# Patient Record
Sex: FEMALE | Race: WHITE | Employment: PART TIME | URBAN - METROPOLITAN AREA
[De-identification: names, ages, dates, MRNs, and addresses within clinical notes are randomized per-mention and may not be internally consistent; named-entity substitution may affect disease eponyms.]

---

## 2018-04-04 ENCOUNTER — OFFICE VISIT (OUTPATIENT)
Dept: PODIATRY | Age: 58
End: 2018-04-04
Payer: COMMERCIAL

## 2018-04-04 VITALS
HEART RATE: 76 BPM | WEIGHT: 185 LBS | DIASTOLIC BLOOD PRESSURE: 62 MMHG | BODY MASS INDEX: 30.82 KG/M2 | HEIGHT: 65 IN | SYSTOLIC BLOOD PRESSURE: 116 MMHG

## 2018-04-04 DIAGNOSIS — L98.9 BENIGN SKIN LESION: Primary | ICD-10-CM

## 2018-04-04 DIAGNOSIS — M79.672 PAIN IN BOTH FEET: ICD-10-CM

## 2018-04-04 DIAGNOSIS — M79.671 PAIN IN BOTH FEET: ICD-10-CM

## 2018-04-04 DIAGNOSIS — L85.1 ACQUIRED PLANTAR KERATODERMA: ICD-10-CM

## 2018-04-04 PROCEDURE — 99203 OFFICE O/P NEW LOW 30 MIN: CPT | Performed by: PODIATRIST

## 2018-04-04 RX ORDER — TRAMADOL HYDROCHLORIDE 50 MG/1
TABLET ORAL
COMMUNITY
Start: 2018-03-14 | End: 2019-10-22 | Stop reason: SDUPTHER

## 2018-04-04 RX ORDER — LEVOTHYROXINE SODIUM 0.1 MG/1
TABLET ORAL
COMMUNITY
Start: 2018-01-10 | End: 2019-02-12 | Stop reason: DRUGHIGH

## 2018-04-04 RX ORDER — CELECOXIB 200 MG/1
CAPSULE ORAL
COMMUNITY
Start: 2018-03-05 | End: 2018-08-06

## 2018-04-04 ASSESSMENT — ENCOUNTER SYMPTOMS
VOMITING: 0
CONSTIPATION: 0
NAUSEA: 0
DIARRHEA: 0
COLOR CHANGE: 0

## 2018-04-30 ENCOUNTER — OFFICE VISIT (OUTPATIENT)
Dept: PODIATRY | Age: 58
End: 2018-04-30
Payer: COMMERCIAL

## 2018-04-30 VITALS
BODY MASS INDEX: 29.73 KG/M2 | SYSTOLIC BLOOD PRESSURE: 120 MMHG | DIASTOLIC BLOOD PRESSURE: 76 MMHG | WEIGHT: 185 LBS | HEIGHT: 66 IN | HEART RATE: 93 BPM

## 2018-04-30 DIAGNOSIS — M79.671 PAIN IN BOTH FEET: ICD-10-CM

## 2018-04-30 DIAGNOSIS — L98.9 BENIGN SKIN LESION: Primary | ICD-10-CM

## 2018-04-30 DIAGNOSIS — M84.374A STRESS FRACTURE OF NAVICULAR BONE OF RIGHT FOOT: ICD-10-CM

## 2018-04-30 DIAGNOSIS — L85.1 ACQUIRED PLANTAR KERATODERMA: ICD-10-CM

## 2018-04-30 DIAGNOSIS — M79.672 PAIN IN BOTH FEET: ICD-10-CM

## 2018-04-30 DIAGNOSIS — M19.071 PRIMARY OSTEOARTHRITIS OF RIGHT FOOT: ICD-10-CM

## 2018-04-30 DIAGNOSIS — M79.671 PAIN IN RIGHT FOOT: ICD-10-CM

## 2018-04-30 PROCEDURE — 99213 OFFICE O/P EST LOW 20 MIN: CPT | Performed by: PODIATRIST

## 2018-04-30 PROCEDURE — 73630 X-RAY EXAM OF FOOT: CPT | Performed by: PODIATRIST

## 2018-04-30 RX ORDER — PREDNISONE 10 MG/1
1 TABLET ORAL DAILY
Qty: 21 EACH | Refills: 0 | Status: SHIPPED | OUTPATIENT
Start: 2018-04-30 | End: 2018-05-22 | Stop reason: ALTCHOICE

## 2018-04-30 ASSESSMENT — ENCOUNTER SYMPTOMS
NAUSEA: 0
VOMITING: 0
DIARRHEA: 0
COLOR CHANGE: 0
CONSTIPATION: 0

## 2018-05-22 ENCOUNTER — OFFICE VISIT (OUTPATIENT)
Dept: PODIATRY | Age: 58
End: 2018-05-22
Payer: COMMERCIAL

## 2018-05-22 VITALS
DIASTOLIC BLOOD PRESSURE: 58 MMHG | WEIGHT: 185 LBS | HEIGHT: 66 IN | HEART RATE: 84 BPM | BODY MASS INDEX: 29.73 KG/M2 | SYSTOLIC BLOOD PRESSURE: 115 MMHG

## 2018-05-22 DIAGNOSIS — M79.671 PAIN IN RIGHT FOOT: ICD-10-CM

## 2018-05-22 DIAGNOSIS — M79.672 PAIN IN BOTH FEET: ICD-10-CM

## 2018-05-22 DIAGNOSIS — M84.374A STRESS FRACTURE OF NAVICULAR BONE OF RIGHT FOOT: Primary | ICD-10-CM

## 2018-05-22 DIAGNOSIS — M79.671 PAIN IN BOTH FEET: ICD-10-CM

## 2018-05-22 DIAGNOSIS — L98.9 BENIGN SKIN LESION: ICD-10-CM

## 2018-05-22 DIAGNOSIS — M19.071 PRIMARY OSTEOARTHRITIS OF RIGHT FOOT: ICD-10-CM

## 2018-05-22 DIAGNOSIS — L85.1 ACQUIRED PLANTAR KERATODERMA: ICD-10-CM

## 2018-05-22 PROCEDURE — 99213 OFFICE O/P EST LOW 20 MIN: CPT | Performed by: PODIATRIST

## 2018-05-22 ASSESSMENT — ENCOUNTER SYMPTOMS
COLOR CHANGE: 0
DIARRHEA: 0
CONSTIPATION: 0
VOMITING: 0
NAUSEA: 0

## 2018-08-06 ENCOUNTER — OFFICE VISIT (OUTPATIENT)
Dept: PODIATRY | Age: 58
End: 2018-08-06
Payer: COMMERCIAL

## 2018-08-06 VITALS
BODY MASS INDEX: 29.73 KG/M2 | WEIGHT: 185 LBS | SYSTOLIC BLOOD PRESSURE: 133 MMHG | HEIGHT: 66 IN | HEART RATE: 70 BPM | DIASTOLIC BLOOD PRESSURE: 69 MMHG

## 2018-08-06 DIAGNOSIS — L98.9 BENIGN SKIN LESION: Primary | ICD-10-CM

## 2018-08-06 DIAGNOSIS — M79.672 PAIN IN BOTH FEET: ICD-10-CM

## 2018-08-06 DIAGNOSIS — M79.671 PAIN IN BOTH FEET: ICD-10-CM

## 2018-08-06 DIAGNOSIS — L85.1 ACQUIRED PLANTAR KERATODERMA: ICD-10-CM

## 2018-08-06 PROCEDURE — 99213 OFFICE O/P EST LOW 20 MIN: CPT | Performed by: PODIATRIST

## 2018-08-06 ASSESSMENT — ENCOUNTER SYMPTOMS
CONSTIPATION: 0
VOMITING: 0
NAUSEA: 0
DIARRHEA: 0
COLOR CHANGE: 0

## 2018-08-06 NOTE — PROGRESS NOTES
[facility]  Return Patient History and Physical    Chief Complaint:   Chief Complaint   Patient presents with    Callouses     callous trim b/l       HPI: Son Huston is a 62 y.o. female who presents to the office today for follow up of painful callouses, pain right foot and left, growing rapidly. They started hurting again 1 month ago. I saw her many years ago for the same problem. She has been seeing Dr. Clara Delgado. Has had orthotics. Works on her feet at [employer]. Currently denies F/C/N/V. No Known Allergies    Past Medical History:   Diagnosis Date    DDD (degenerative disc disease), thoracolumbar     Osteoarthritis        Prior to Admission medications    Medication Sig Start Date End Date Taking? Authorizing Provider   traMADol Bosie Burner) 50 MG tablet  3/14/18  Yes Historical Provider, MD   sertraline (ZOLOFT) 50 MG tablet  3/18/18  Yes Historical Provider, MD   levothyroxine (SYNTHROID) 100 MCG tablet  1/10/18  Yes Historical Provider, MD       Past Surgical History:   Procedure Laterality Date    APPENDECTOMY      CARPAL TUNNEL RELEASE      FOOT SURGERY      GALLBLADDER SURGERY         Family History   Problem Relation Age of Onset    Arthritis Mother     Cancer Mother     Arthritis Father     Arthritis Brother     Diabetes Maternal Uncle     Arthritis Maternal Grandmother     Cancer Maternal Grandmother     Diabetes Maternal Grandmother     Cancer Maternal Grandfather        Social History   Substance Use Topics    Smoking status: Current Every Day Smoker     Packs/day: 0.50     Years: 40.00     Types: Cigarettes    Smokeless tobacco: Never Used    Alcohol use Not on file       Review of Systems   Constitutional: Negative for chills, diaphoresis, fatigue, fever and unexpected weight change. Cardiovascular: Negative for leg swelling. Gastrointestinal: Negative for constipation, diarrhea, nausea and vomiting. Musculoskeletal: Positive for gait problem.  Negative RTC in 4-6 weeks    8/6/2018

## 2018-10-09 ENCOUNTER — OFFICE VISIT (OUTPATIENT)
Dept: PODIATRY | Age: 58
End: 2018-10-09
Payer: COMMERCIAL

## 2018-10-09 VITALS
SYSTOLIC BLOOD PRESSURE: 124 MMHG | HEART RATE: 63 BPM | WEIGHT: 185 LBS | DIASTOLIC BLOOD PRESSURE: 68 MMHG | HEIGHT: 67 IN | BODY MASS INDEX: 29.03 KG/M2

## 2018-10-09 DIAGNOSIS — M79.671 PAIN IN BOTH FEET: ICD-10-CM

## 2018-10-09 DIAGNOSIS — L85.1 ACQUIRED PLANTAR KERATODERMA: ICD-10-CM

## 2018-10-09 DIAGNOSIS — M79.672 PAIN IN BOTH FEET: ICD-10-CM

## 2018-10-09 DIAGNOSIS — L98.9 BENIGN SKIN LESION: Primary | ICD-10-CM

## 2018-10-09 PROCEDURE — 99212 OFFICE O/P EST SF 10 MIN: CPT | Performed by: PODIATRIST

## 2018-10-09 RX ORDER — CELECOXIB 200 MG/1
200 CAPSULE ORAL
COMMUNITY
Start: 2018-06-27 | End: 2022-06-30 | Stop reason: DRUGHIGH

## 2018-10-09 ASSESSMENT — ENCOUNTER SYMPTOMS
NAUSEA: 0
CONSTIPATION: 0
DIARRHEA: 0
VOMITING: 0
COLOR CHANGE: 0

## 2018-10-09 NOTE — PROGRESS NOTES
diarrhea, nausea and vomiting. Musculoskeletal: Positive for gait problem. Negative for arthralgias and joint swelling. Skin: Negative for color change, pallor, rash and wound. Neurological: Negative for weakness and numbness. Lower Extremity Physical Examination:     Vitals:   Vitals:    10/09/18 1448   BP: 124/68   Pulse: 63     General: AAO x 3 in NAD. Vascular: DP and PT pulses palpable 2/4, Bilateral.  CFT <3 seconds, Bilateral.  Hair growth present to the level of the digits, Bilateral.  Edema absent, Bilateral.  Varicosities absent, Bilateral. Erythema absent, Bilateral    Neurological:  Sensation present to light touch to level of digits, Bilateral.    Musculoskeletal: Muscle strength 5/5, Bilateral.  Decreased fat pad sub 5th met bilateral, adductovarus left 5th toe. No Pain dorsal and medial navicular right, and resolved at the T-N joint, insertion of the PT tendon also. Integument: Warm, dry, supple, Bilateral.  Hyperkeratosis with hard central core, 3 under right 5th met head, one right sub 1st met head, left 5th toe. Radiographs: 3 views right Foot:  Severe degenerative changes at the talo-navicular joint. Fracture of the dorsal portion of the navicular, non-displaced and age indeterminate Generalized osteopenia. Asessment: Patient is a 62 y.o. female with:   1. Benign skin lesion    2. Pain in both feet    3. Acquired plantar keratoderma          Plan: Patient examined and evaluated. Current condition and treatment options discussed in detail. Verbal and written instructions given to patient. Contact office with any questions/problems/concerns. The lesion(s) was partially debrided, enucleated, and silver nitrate was applied with an apperature pad under occlusion. The patient will leave in place for 24-48 hours and than remove. The patient tolerated the procedure well and without complication.        Wear good shoes    No orders of the defined types were placed in

## 2018-12-11 ENCOUNTER — OFFICE VISIT (OUTPATIENT)
Dept: PODIATRY | Age: 58
End: 2018-12-11
Payer: COMMERCIAL

## 2018-12-11 VITALS — BODY MASS INDEX: 29.03 KG/M2 | HEIGHT: 67 IN | WEIGHT: 185 LBS

## 2018-12-11 DIAGNOSIS — M79.671 PAIN IN BOTH FEET: ICD-10-CM

## 2018-12-11 DIAGNOSIS — L85.1 ACQUIRED PLANTAR KERATODERMA: ICD-10-CM

## 2018-12-11 DIAGNOSIS — L98.9 BENIGN SKIN LESION: Primary | ICD-10-CM

## 2018-12-11 DIAGNOSIS — M79.672 PAIN IN BOTH FEET: ICD-10-CM

## 2018-12-11 PROCEDURE — 99213 OFFICE O/P EST LOW 20 MIN: CPT | Performed by: PODIATRIST

## 2018-12-12 ASSESSMENT — ENCOUNTER SYMPTOMS
VOMITING: 0
COLOR CHANGE: 0
DIARRHEA: 0
NAUSEA: 0
CONSTIPATION: 0

## 2018-12-12 NOTE — PROGRESS NOTES
the defined types were placed in this encounter. No orders of the defined types were placed in this encounter.        RTC in 4-6 weeks    12/11/2018

## 2019-02-12 ENCOUNTER — OFFICE VISIT (OUTPATIENT)
Dept: PODIATRY | Age: 59
End: 2019-02-12
Payer: COMMERCIAL

## 2019-02-12 VITALS — BODY MASS INDEX: 27.94 KG/M2 | WEIGHT: 178 LBS | HEIGHT: 67 IN

## 2019-02-12 DIAGNOSIS — L85.1 ACQUIRED PLANTAR KERATODERMA: ICD-10-CM

## 2019-02-12 DIAGNOSIS — M79.671 PAIN IN BOTH FEET: ICD-10-CM

## 2019-02-12 DIAGNOSIS — M79.672 PAIN IN BOTH FEET: ICD-10-CM

## 2019-02-12 DIAGNOSIS — L98.9 BENIGN SKIN LESION: Primary | ICD-10-CM

## 2019-02-12 PROCEDURE — 99213 OFFICE O/P EST LOW 20 MIN: CPT | Performed by: PODIATRIST

## 2019-02-12 RX ORDER — LEVOTHYROXINE SODIUM 0.07 MG/1
TABLET ORAL
Refills: 3 | COMMUNITY
Start: 2019-01-14 | End: 2021-02-18

## 2019-02-12 ASSESSMENT — ENCOUNTER SYMPTOMS
VOMITING: 0
COLOR CHANGE: 0
CONSTIPATION: 0
NAUSEA: 0
DIARRHEA: 0

## 2019-04-16 ENCOUNTER — OFFICE VISIT (OUTPATIENT)
Dept: PODIATRY | Age: 59
End: 2019-04-16
Payer: COMMERCIAL

## 2019-04-16 VITALS — WEIGHT: 180 LBS | HEIGHT: 63 IN | BODY MASS INDEX: 31.89 KG/M2

## 2019-04-16 DIAGNOSIS — L98.9 BENIGN SKIN LESION: Primary | ICD-10-CM

## 2019-04-16 DIAGNOSIS — L85.1 ACQUIRED PLANTAR KERATODERMA: ICD-10-CM

## 2019-04-16 DIAGNOSIS — M79.672 PAIN IN BOTH FEET: ICD-10-CM

## 2019-04-16 DIAGNOSIS — M79.671 PAIN IN BOTH FEET: ICD-10-CM

## 2019-04-16 PROCEDURE — 99213 OFFICE O/P EST LOW 20 MIN: CPT | Performed by: PODIATRIST

## 2019-04-16 ASSESSMENT — ENCOUNTER SYMPTOMS
DIARRHEA: 0
VOMITING: 0
NAUSEA: 0
COLOR CHANGE: 0
CONSTIPATION: 0

## 2019-04-16 NOTE — PROGRESS NOTES
[facility]  Return Patient History and Physical    Chief Complaint:   Chief Complaint   Patient presents with    Callouses     B/L CALLOUS TRIM       HPI: Dave Agee is a 62 y.o. female who presents to the office today for follow up of painful callouses, pain right foot and left, growing rapidly. Periodic debridements help. Painful at work. I saw her many years ago for the same problem. She has been seeing Dr. Charmian Halsted. Has had orthotics. Works on her feet at [employer]. Currently denies F/C/N/V. No Known Allergies    Past Medical History:   Diagnosis Date    DDD (degenerative disc disease), thoracolumbar     Osteoarthritis        Prior to Admission medications    Medication Sig Start Date End Date Taking?  Authorizing Provider   levothyroxine (SYNTHROID) 75 MCG tablet TAKE 1 TABLET BY MOUTH, ON AN EMPTY STOMACH, ONE TIME A DAY IN THE MORNING. 1/14/19  Yes Historical Provider, MD   celecoxib (CELEBREX) 200 MG capsule Take 200 mg by mouth 6/27/18  Yes Historical Provider, MD   traMADol [place]) 50 MG tablet  3/14/18  Yes Historical Provider, MD   sertraline (ZOLOFT) 50 MG tablet  3/18/18  Yes Historical Provider, MD       Past Surgical History:   Procedure Laterality Date    APPENDECTOMY      CARPAL TUNNEL RELEASE      FOOT SURGERY      GALLBLADDER SURGERY         Family History   Problem Relation Age of Onset    Arthritis Mother     Cancer Mother     Arthritis Father     Arthritis Brother     Diabetes Maternal Uncle     Arthritis Maternal Grandmother     Cancer Maternal Grandmother     Diabetes Maternal Grandmother     Cancer Maternal Grandfather        Social History     Tobacco Use    Smoking status: Current Every Day Smoker     Packs/day: 0.50     Years: 40.00     Pack years: 20.00     Types: Cigarettes    Smokeless tobacco: Never Used   Substance Use Topics    Alcohol use: Not on file       Review of Systems   Constitutional: Negative for chills, diaphoresis, fatigue, fever and unexpected weight change. Cardiovascular: Negative for leg swelling. Gastrointestinal: Negative for constipation, diarrhea, nausea and vomiting. Musculoskeletal: Positive for gait problem. Negative for arthralgias and joint swelling. Skin: Negative for color change, pallor, rash and wound. Neurological: Negative for weakness and numbness. Lower Extremity Physical Examination:     Vitals: There were no vitals filed for this visit. General: AAO x 3 in NAD. Vascular: DP and PT pulses palpable 2/4, Bilateral.  CFT <3 seconds, Bilateral.  Hair growth present to the level of the digits, Bilateral.  Edema absent, Bilateral.  Varicosities absent, Bilateral. Erythema absent, Bilateral    Neurological:  Sensation present to light touch to level of digits, Bilateral.    Musculoskeletal: Muscle strength 5/5, Bilateral.  Decreased fat pad sub 5th met bilateral, adductovarus left 5th toe. No Pain dorsal and medial navicular right, and resolved at the T-N joint, insertion of the PT tendon also. Integument: Warm, dry, supple, Bilateral.  Hyperkeratosis with hard central core, 3 under right 5th met head, one right sub 1st met head, left 5th toe. Radiographs: 3 views right Foot:  Severe degenerative changes at the talo-navicular joint. Fracture of the dorsal portion of the navicular, non-displaced and age indeterminate Generalized osteopenia. Asessment: Patient is a 62 y.o. female with:   1. Benign skin lesion    2. Pain in both feet    3. Acquired plantar keratoderma          Plan: Patient examined and evaluated. Current condition and treatment options discussed in detail. Verbal and written instructions given to patient. Contact office with any questions/problems/concerns. The lesion(s) was partially debrided, enucleated, and silver nitrate was applied with an apperature pad under occlusion. The patient will leave in place for 24-48 hours and than remove.  The patient tolerated the procedure well and without complication. Wear good shoes    No orders of the defined types were placed in this encounter. No orders of the defined types were placed in this encounter.        RTC in 4-6 weeks    4/16/2019

## 2019-06-18 ENCOUNTER — OFFICE VISIT (OUTPATIENT)
Dept: PODIATRY | Age: 59
End: 2019-06-18
Payer: COMMERCIAL

## 2019-06-18 VITALS — HEIGHT: 66 IN | WEIGHT: 185 LBS | BODY MASS INDEX: 29.73 KG/M2

## 2019-06-18 DIAGNOSIS — M79.672 PAIN IN BOTH FEET: ICD-10-CM

## 2019-06-18 DIAGNOSIS — M19.071 PRIMARY OSTEOARTHRITIS OF RIGHT FOOT: ICD-10-CM

## 2019-06-18 DIAGNOSIS — M79.671 PAIN IN BOTH FEET: ICD-10-CM

## 2019-06-18 DIAGNOSIS — D23.72 BENIGN NEOPLASM OF SKIN OF FOOT, LEFT: ICD-10-CM

## 2019-06-18 DIAGNOSIS — M79.671 PAIN IN RIGHT FOOT: Primary | ICD-10-CM

## 2019-06-18 DIAGNOSIS — L85.1 ACQUIRED PLANTAR KERATODERMA: ICD-10-CM

## 2019-06-18 DIAGNOSIS — L98.9 BENIGN SKIN LESION: ICD-10-CM

## 2019-06-18 DIAGNOSIS — D23.71 BENIGN NEOPLASM OF SKIN OF FOOT, RIGHT: ICD-10-CM

## 2019-06-18 PROCEDURE — 17110 DESTRUCTION B9 LES UP TO 14: CPT | Performed by: PODIATRIST

## 2019-06-18 PROCEDURE — 99213 OFFICE O/P EST LOW 20 MIN: CPT | Performed by: PODIATRIST

## 2019-06-18 ASSESSMENT — ENCOUNTER SYMPTOMS
COLOR CHANGE: 0
NAUSEA: 0
CONSTIPATION: 0
DIARRHEA: 0
VOMITING: 0

## 2019-06-18 NOTE — LETTER
[facility]  [address]  [address] [address]  Phone: [phone]  Fax: [phone]    Pulidojez Ivey        June 18, 2019     Patient: Ivory Shrestha   YOB: 1960   Date of Visit: 6/18/2019       To Whom it May Concern:    Marin Johnson was seen in my clinic on 6/18/2019. She may return to work on 6/19/19 but needs to wear cam walker boot on the right until 7/3/19. If you have any questions or concerns, please don't hesitate to call.     Sincerely,         Brent Springer DPM

## 2019-06-18 NOTE — PROGRESS NOTES
[facility]  Return Patient History and Physical    Chief Complaint:   Chief Complaint   Patient presents with    Callouses     b/l callouses    Foot Pain     left side of the right foot painful        HPI: Nina Bennett is a 61 y.o. female who presents to the office today for follow up of painful callouses, pain right foot and left, growing rapidly. Periodic debridements help. Painful at work. Works on her feet at [employer]. Currently denies F/C/N/V. She relates worsening pain right arch, has had issues here before, had orthotics, not wearing them, taking Celebrex, no swelling. No injury, working a lot     No Known Allergies    Past Medical History:   Diagnosis Date    DDD (degenerative disc disease), thoracolumbar     Osteoarthritis        Prior to Admission medications    Medication Sig Start Date End Date Taking?  Authorizing Provider   diclofenac sodium 1 % GEL Apply 4 g topically 4 times daily 6/18/19  Yes Anahi Irving DPM   levothyroxine (SYNTHROID) 75 MCG tablet TAKE 1 TABLET BY MOUTH, ON AN EMPTY STOMACH, ONE TIME A DAY IN THE MORNING. 1/14/19  Yes Historical Provider, MD   celecoxib (CELEBREX) 200 MG capsule Take 200 mg by mouth 6/27/18  Yes Historical Provider, MD   traMADol Dalphine Left) 50 MG tablet  3/14/18  Yes Historical Provider, MD   sertraline (ZOLOFT) 50 MG tablet  3/18/18  Yes Historical Provider, MD       Past Surgical History:   Procedure Laterality Date    APPENDECTOMY      CARPAL TUNNEL RELEASE      FOOT SURGERY      GALLBLADDER SURGERY         Family History   Problem Relation Age of Onset    Arthritis Mother     Cancer Mother     Arthritis Father     Arthritis Brother     Diabetes Maternal Uncle     Arthritis Maternal Grandmother     Cancer Maternal Grandmother     Diabetes Maternal Grandmother     Cancer Maternal Grandfather        Social History     Tobacco Use    Smoking status: Current Every Day Smoker     Packs/day: 0.50     Years: 40.00     Pack years: 20.00     Types: Cigarettes    Smokeless tobacco: Never Used   Substance Use Topics    Alcohol use: Not on file       Review of Systems   Constitutional: Negative for chills, diaphoresis, fatigue, fever and unexpected weight change. Cardiovascular: Negative for leg swelling. Gastrointestinal: Negative for constipation, diarrhea, nausea and vomiting. Musculoskeletal: Positive for gait problem. Negative for arthralgias and joint swelling. Skin: Negative for color change, pallor, rash and wound. Neurological: Negative for weakness and numbness. Lower Extremity Physical Examination:     Vitals: There were no vitals filed for this visit. General: AAO x 3 in NAD. Vascular: DP and PT pulses palpable 2/4, Bilateral.  CFT <3 seconds, Bilateral.  Hair growth present to the level of the digits, Bilateral.  Edema absent, Bilateral.  Varicosities absent, Bilateral. Erythema absent, Bilateral    Neurological:  Sensation present to light touch to level of digits, Bilateral.    Musculoskeletal: Muscle strength 5/5, Bilateral.  Decreased fat pad sub 5th met bilateral, adductovarus left 5th toe. Pain dorsal and medial navicular right,  at the T-N joint, insertion of the PT tendon also. Pain medial first met cun joint, medial cun-kia joint. Integument: Warm, dry, supple, Bilateral.  Hyperkeratosis with hard central core, 3 under right 5th met head, one right sub 1st met head, left 5th toe. Radiographs: 3 views right Foot:  Severe degenerative changes at the talo-navicular joint, navicular cuneiform joint, and first metatarsal cuneiform joint. Fracture of the dorsal portion of the navicular, non-displaced and age indeterminate Generalized osteopenia. Asessment: Patient is a 61 y.o. female with:   1. Pain in right foot    2. Benign skin lesion    3. Pain in both feet    4. Acquired plantar keratoderma    5. Primary osteoarthritis of right foot    6.  Benign neoplasm of skin of foot, right

## 2019-07-02 ENCOUNTER — OFFICE VISIT (OUTPATIENT)
Dept: PODIATRY | Age: 59
End: 2019-07-02
Payer: COMMERCIAL

## 2019-07-02 VITALS — BODY MASS INDEX: 29.73 KG/M2 | HEIGHT: 66 IN | WEIGHT: 185 LBS

## 2019-07-02 DIAGNOSIS — M65.9 SYNOVITIS OF RIGHT FOOT: ICD-10-CM

## 2019-07-02 DIAGNOSIS — M19.071 PRIMARY OSTEOARTHRITIS OF RIGHT FOOT: Primary | ICD-10-CM

## 2019-07-02 PROCEDURE — 20605 DRAIN/INJ JOINT/BURSA W/O US: CPT | Performed by: PODIATRIST

## 2019-07-02 PROCEDURE — 99213 OFFICE O/P EST LOW 20 MIN: CPT | Performed by: PODIATRIST

## 2019-07-02 RX ORDER — BUPIVACAINE HYDROCHLORIDE 5 MG/ML
1 INJECTION, SOLUTION PERINEURAL ONCE
Status: COMPLETED | OUTPATIENT
Start: 2019-07-02 | End: 2019-07-02

## 2019-07-02 RX ADMIN — BUPIVACAINE HYDROCHLORIDE 5 MG: 5 INJECTION, SOLUTION PERINEURAL at 17:15

## 2019-07-02 ASSESSMENT — ENCOUNTER SYMPTOMS
DIARRHEA: 0
COLOR CHANGE: 0
NAUSEA: 0
CONSTIPATION: 0
VOMITING: 0

## 2019-07-02 NOTE — PROGRESS NOTES
[facility]  Return Patient History and Physical    Chief Complaint:   Chief Complaint   Patient presents with   [place]     reassess right foot, doing better but still having pain        HPI: Josh Green is a 61 y.o. female who presents to the office today for follow up of  pain right arch, the CAM walker helped, but she only wore it for 2 weeks, now back into shoes, Voltaren gel helps temporarily, has had issues here before, had orthotics, not wearing them, taking Celebrex, no swelling. No injury, working a lot     No Known Allergies    Past Medical History:   Diagnosis Date    DDD (degenerative disc disease), thoracolumbar     Osteoarthritis        Prior to Admission medications    Medication Sig Start Date End Date Taking?  Authorizing Provider   diclofenac sodium 1 % GEL Apply 4 g topically 4 times daily 6/19/19  Yes Anahi Kuhn DPM   celecoxib (CELEBREX) 200 MG capsule Take 200 mg by mouth 6/27/18  Yes Historical Provider, MD   levothyroxine (SYNTHROID) 75 MCG tablet TAKE 1 TABLET BY MOUTH, ON AN EMPTY STOMACH, ONE TIME A DAY IN THE MORNING. 1/14/19   Historical Provider, MD   traMADol Nadira Catching) 50 MG tablet  3/14/18   Historical Provider, MD   sertraline (ZOLOFT) 50 MG tablet  3/18/18   Historical Provider, MD       Past Surgical History:   Procedure Laterality Date    APPENDECTOMY      CARPAL TUNNEL RELEASE      FOOT SURGERY      GALLBLADDER SURGERY         Family History   Problem Relation Age of Onset    Arthritis Mother     Cancer Mother     Arthritis Father     Arthritis Brother     Diabetes Maternal Uncle     Arthritis Maternal Grandmother     Cancer Maternal Grandmother     Diabetes Maternal Grandmother     Cancer Maternal Grandfather        Social History     Tobacco Use    Smoking status: Current Every Day Smoker     Packs/day: 0.50     Years: 40.00     Pack years: 20.00     Types: Cigarettes    Smokeless tobacco: Never Used   Substance Use Topics    Alcohol use: Not on file       Review of Systems   Constitutional: Negative for chills, diaphoresis, fatigue, fever and unexpected weight change. Cardiovascular: Negative for leg swelling. Gastrointestinal: Negative for constipation, diarrhea, nausea and vomiting. Musculoskeletal: Positive for gait problem. Negative for arthralgias and joint swelling. Skin: Negative for color change, pallor, rash and wound. Neurological: Negative for weakness and numbness. Lower Extremity Physical Examination:     Vitals: There were no vitals filed for this visit. General: AAO x 3 in NAD. Vascular: DP and PT pulses palpable 2/4, Bilateral.  CFT <3 seconds, Bilateral.  Hair growth present to the level of the digits, Bilateral.  Edema absent, Bilateral.  Varicosities absent, Bilateral. Erythema absent, Bilateral    Neurological:  Sensation present to light touch to level of digits, Bilateral.    Musculoskeletal: Muscle strength 5/5, Bilateral.  Decreased fat pad sub 5th met bilateral, adductovarus left 5th toe. Pain dorsal and medial navicular right,  at the T-N joint, insertion of the PT tendon also. Pain medial first met cun joint, medial cun-kia joint. Integument: Warm, dry, supple, Bilateral.  Hyperkeratosis with hard central core, 3 under right 5th met head, one right sub 1st met head, left 5th toe. Radiographs: 3 views right Foot:  Severe degenerative changes at the talo-navicular joint, navicular cuneiform joint, and first metatarsal cuneiform joint. Fracture of the dorsal portion of the navicular, non-displaced and age indeterminate Generalized osteopenia. Asessment: Patient is a 61 y.o. female with:   1. Primary osteoarthritis of right foot    2. Synovitis of right foot          Plan: Patient examined and evaluated. Current condition and treatment options discussed in detail. Verbal and written instructions given to patient. Contact office with any questions/problems/concerns.      The

## 2019-08-20 ENCOUNTER — OFFICE VISIT (OUTPATIENT)
Dept: PODIATRY | Age: 59
End: 2019-08-20
Payer: COMMERCIAL

## 2019-08-20 VITALS — BODY MASS INDEX: 29.73 KG/M2 | HEIGHT: 66 IN | WEIGHT: 185 LBS

## 2019-08-20 DIAGNOSIS — D23.72 BENIGN NEOPLASM OF SKIN OF FOOT, LEFT: ICD-10-CM

## 2019-08-20 DIAGNOSIS — L98.9 BENIGN SKIN LESION: ICD-10-CM

## 2019-08-20 DIAGNOSIS — M79.671 PAIN IN BOTH FEET: ICD-10-CM

## 2019-08-20 DIAGNOSIS — L85.1 ACQUIRED PLANTAR KERATODERMA: ICD-10-CM

## 2019-08-20 DIAGNOSIS — D23.71 BENIGN NEOPLASM OF SKIN OF FOOT, RIGHT: Primary | ICD-10-CM

## 2019-08-20 DIAGNOSIS — M79.672 PAIN IN BOTH FEET: ICD-10-CM

## 2019-08-20 PROCEDURE — 99213 OFFICE O/P EST LOW 20 MIN: CPT | Performed by: PODIATRIST

## 2019-08-20 ASSESSMENT — ENCOUNTER SYMPTOMS
VOMITING: 0
CONSTIPATION: 0
DIARRHEA: 0
COLOR CHANGE: 0
NAUSEA: 0

## 2019-08-20 NOTE — PROGRESS NOTES
[facility]  Return Patient History and Physical    Chief Complaint:   Chief Complaint   Patient presents with    Callouses     callous trim b/l       HPI: Brittany Tavarez is a 61 y.o. female who presents to the office today for follow up of painful callouses, pain right foot and left, growing rapidly. Periodic debridements help. Painful at work. I saw her many years ago for the same problem. She has been seeing Dr. Cesar Cruz. Has had orthotics. Works on her feet at [employer]. Currently denies F/C/N/V. No Known Allergies    Past Medical History:   Diagnosis Date    DDD (degenerative disc disease), thoracolumbar     Osteoarthritis        Prior to Admission medications    Medication Sig Start Date End Date Taking?  Authorizing Provider   diclofenac sodium 1 % GEL Apply 4 g topically 4 times daily 6/19/19  Yes Anahi Perkins DPM   levothyroxine (SYNTHROID) 75 MCG tablet TAKE 1 TABLET BY MOUTH, ON AN EMPTY STOMACH, ONE TIME A DAY IN THE MORNING. 1/14/19  Yes Historical Provider, MD   celecoxib (CELEBREX) 200 MG capsule Take 200 mg by mouth 6/27/18  Yes Historical Provider, MD   traMADol Vesna Yanyer) 50 MG tablet  3/14/18  Yes Historical Provider, MD   sertraline (ZOLOFT) 50 MG tablet  3/18/18  Yes Historical Provider, MD       Past Surgical History:   Procedure Laterality Date    APPENDECTOMY      CARPAL TUNNEL RELEASE      FOOT SURGERY      GALLBLADDER SURGERY         Family History   Problem Relation Age of Onset    Arthritis Mother     Cancer Mother     Arthritis Father     Arthritis Brother     Diabetes Maternal Uncle     Arthritis Maternal Grandmother     Cancer Maternal Grandmother     Diabetes Maternal Grandmother     Cancer Maternal Grandfather        Social History     Tobacco Use    Smoking status: Current Every Day Smoker     Packs/day: 0.50     Years: 40.00     Pack years: 20.00     Types: Cigarettes    Smokeless tobacco: Never Used   Substance Use Topics    Alcohol use: partially debrided, enucleated, and silver nitrate was applied with an apperature pad under occlusion. The patient will leave in place for 24-48 hours and than remove. The patient tolerated the procedure well and without complication. Wear good shoes    No orders of the defined types were placed in this encounter. No orders of the defined types were placed in this encounter.        RTC in 4-6 weeks    8/20/2019

## 2019-10-22 ENCOUNTER — OFFICE VISIT (OUTPATIENT)
Dept: PODIATRY | Age: 59
End: 2019-10-22
Payer: COMMERCIAL

## 2019-10-22 VITALS — BODY MASS INDEX: 29.73 KG/M2 | WEIGHT: 185 LBS | HEIGHT: 66 IN

## 2019-10-22 DIAGNOSIS — L85.1 ACQUIRED PLANTAR KERATODERMA: ICD-10-CM

## 2019-10-22 DIAGNOSIS — M79.671 PAIN IN BOTH FEET: ICD-10-CM

## 2019-10-22 DIAGNOSIS — D23.71 BENIGN NEOPLASM OF SKIN OF FOOT, RIGHT: Primary | ICD-10-CM

## 2019-10-22 DIAGNOSIS — D23.72 BENIGN NEOPLASM OF SKIN OF FOOT, LEFT: ICD-10-CM

## 2019-10-22 DIAGNOSIS — M79.672 PAIN IN BOTH FEET: ICD-10-CM

## 2019-10-22 PROCEDURE — 99213 OFFICE O/P EST LOW 20 MIN: CPT | Performed by: PODIATRIST

## 2019-10-22 RX ORDER — TRAMADOL HYDROCHLORIDE 50 MG/1
50 TABLET ORAL
COMMUNITY
Start: 2019-09-30 | End: 2020-01-07 | Stop reason: SDUPTHER

## 2019-10-22 ASSESSMENT — ENCOUNTER SYMPTOMS
CONSTIPATION: 0
VOMITING: 0
COLOR CHANGE: 0
DIARRHEA: 0
NAUSEA: 0

## 2020-01-07 ENCOUNTER — OFFICE VISIT (OUTPATIENT)
Dept: PODIATRY | Age: 60
End: 2020-01-07
Payer: COMMERCIAL

## 2020-01-07 VITALS — WEIGHT: 185 LBS | BODY MASS INDEX: 29.73 KG/M2 | HEIGHT: 66 IN

## 2020-01-07 PROCEDURE — 99213 OFFICE O/P EST LOW 20 MIN: CPT | Performed by: PODIATRIST

## 2020-01-07 RX ORDER — TRAMADOL HYDROCHLORIDE 50 MG/1
50 TABLET ORAL
COMMUNITY
Start: 2020-01-07

## 2020-01-07 ASSESSMENT — ENCOUNTER SYMPTOMS
VOMITING: 0
COLOR CHANGE: 0
CONSTIPATION: 0
NAUSEA: 0
DIARRHEA: 0

## 2020-01-07 NOTE — PROGRESS NOTES
[address] [place] [address] Podiatry  Return Patient    Chief Complaint:   Chief Complaint   Patient presents with    Callouses     B/L TRIM       HPI: Armin Coello is a 61 y.o. female who presents to the office today for follow up of painful callouses, pain right foot and left, growing rapidly. Periodic debridements help. Painful at work. I saw her many years ago for the same problem. She has been seeing Dr. Lee Alvarez. Has had orthotics. Works on her feet at [employer]. Currently denies F/C/N/V. No Known Allergies    Past Medical History:   Diagnosis Date    DDD (degenerative disc disease), thoracolumbar     Osteoarthritis        Prior to Admission medications    Medication Sig Start Date End Date Taking? Authorizing Provider   traMADol (ULTRAM) 50 MG tablet Take 50 mg by mouth. 1/7/20  Yes Historical Provider, MD   levothyroxine (SYNTHROID) 75 MCG tablet TAKE 1 TABLET BY MOUTH, ON AN EMPTY STOMACH, ONE TIME A DAY IN THE MORNING. 1/14/19  Yes Historical Provider, MD   celecoxib (CELEBREX) 200 MG capsule Take 200 mg by mouth 6/27/18  Yes Historical Provider, MD       Past Surgical History:   Procedure Laterality Date    APPENDECTOMY      CARPAL TUNNEL RELEASE      FOOT SURGERY      GALLBLADDER SURGERY         Family History   Problem Relation Age of Onset    Arthritis Mother     Cancer Mother     Arthritis Father     Arthritis Brother     Diabetes Maternal Uncle     Arthritis Maternal Grandmother     Cancer Maternal Grandmother     Diabetes Maternal Grandmother     Cancer Maternal Grandfather        Social History     Tobacco Use    Smoking status: Current Every Day Smoker     Packs/day: 0.50     Years: 40.00     Pack years: 20.00     Types: Cigarettes    Smokeless tobacco: Never Used   Substance Use Topics    Alcohol use: Not on file       Review of Systems   Constitutional: Negative for chills, diaphoresis, fatigue, fever and unexpected weight change. Cardiovascular: Negative for leg swelling. 24-48 hours and than remove. The patient tolerated the procedure well and without complication. Wear good shoes    No orders of the defined types were placed in this encounter. No orders of the defined types were placed in this encounter.        RTC in 9 weeks    1/7/2020

## 2020-03-12 ENCOUNTER — OFFICE VISIT (OUTPATIENT)
Dept: PODIATRY | Age: 60
End: 2020-03-12
Payer: COMMERCIAL

## 2020-03-12 VITALS — BODY MASS INDEX: 28.93 KG/M2 | WEIGHT: 180 LBS | HEIGHT: 66 IN

## 2020-03-12 PROCEDURE — 99213 OFFICE O/P EST LOW 20 MIN: CPT | Performed by: PODIATRIST

## 2020-03-12 ASSESSMENT — ENCOUNTER SYMPTOMS
VOMITING: 0
CONSTIPATION: 0
COLOR CHANGE: 0
NAUSEA: 0
DIARRHEA: 0

## 2020-03-12 NOTE — PROGRESS NOTES
[facility]  Return Patient    Chief Complaint:   Chief Complaint   Patient presents with    Nail Problem     b/l nail trim/ last seen Meghan Medina 1/7/2020       HPI: Bacilio Smith is a 61 y.o. female who presents to the office today for follow up of painful callouses, pain right foot and left, growing rapidly. Periodic debridements help. Painful at work. I saw her many years ago for the same problem. She has been seeing Dr. Sylvia Montes. Has had orthotics. Works on her feet at [employer]. Currently denies F/C/N/V. No Known Allergies    Past Medical History:   Diagnosis Date    DDD (degenerative disc disease), thoracolumbar     Osteoarthritis        Prior to Admission medications    Medication Sig Start Date End Date Taking? Authorizing Provider   sertraline (ZOLOFT) 50 MG tablet Take 50 mg by mouth daily   Yes Historical Provider, MD   traMADol (ULTRAM) 50 MG tablet Take 50 mg by mouth.  1/7/20  Yes Historical Provider, MD   levothyroxine (SYNTHROID) 75 MCG tablet TAKE 1 TABLET BY MOUTH, ON AN EMPTY STOMACH, ONE TIME A DAY IN THE MORNING. 1/14/19  Yes Historical Provider, MD   celecoxib (CELEBREX) 200 MG capsule Take 200 mg by mouth 6/27/18  Yes Historical Provider, MD       Past Surgical History:   Procedure Laterality Date    APPENDECTOMY      CARPAL TUNNEL RELEASE      FOOT SURGERY      GALLBLADDER SURGERY         Family History   Problem Relation Age of Onset    Arthritis Mother     Cancer Mother     Arthritis Father     Arthritis Brother     Diabetes Maternal Uncle     Arthritis Maternal Grandmother     Cancer Maternal Grandmother     Diabetes Maternal Grandmother     Cancer Maternal Grandfather        Social History     Tobacco Use    Smoking status: Current Every Day Smoker     Packs/day: 0.50     Years: 40.00     Pack years: 20.00     Types: Cigarettes    Smokeless tobacco: Never Used   Substance Use Topics    Alcohol use: Not on file       Review of Systems Constitutional: Negative for chills, diaphoresis, fatigue, fever and unexpected weight change. Cardiovascular: Negative for leg swelling. Gastrointestinal: Negative for constipation, diarrhea, nausea and vomiting. Musculoskeletal: Positive for gait problem. Negative for arthralgias and joint swelling. Skin: Negative for color change, pallor, rash and wound. Neurological: Negative for weakness and numbness. Lower Extremity Physical Examination:     Vitals: There were no vitals filed for this visit. General: AAO x 3 in NAD. Vascular: DP and PT pulses palpable 2/4, Bilateral.  CFT <3 seconds, Bilateral.  Hair growth present to the level of the digits, Bilateral.  Edema absent, Bilateral.  Varicosities absent, Bilateral. Erythema absent, Bilateral    Neurological:  Sensation present to light touch to level of digits, Bilateral.    Musculoskeletal: Muscle strength 5/5, Bilateral.  Decreased fat pad sub 5th met bilateral, adductovarus left 5th toe. No Pain dorsal and medial navicular right, and resolved at the T-N joint, insertion of the PT tendon also. Integument: Warm, dry, supple, Bilateral.  Hyperkeratosis with hard central core, 3 under right 5th met head, one right sub 1st met head, left 5th toe. Radiographs: 3 views right Foot:  Severe degenerative changes at the talo-navicular joint. Fracture of the dorsal portion of the navicular, non-displaced and age indeterminate Generalized osteopenia. Asessment: Patient is a 61 y.o. female with:   1. Benign neoplasm of skin of foot, right    2. Benign neoplasm of skin of foot, left    3. Pain in both feet    4. Acquired plantar keratoderma          Plan: Patient examined and evaluated. Current condition and treatment options discussed in detail. Verbal and written instructions given to patient. Contact office with any questions/problems/concerns.      The lesion(s) was partially debrided, enucleated, and silver nitrate was applied with an apperature pad under occlusion. The patient will leave in place for 24-48 hours and than remove. The patient tolerated the procedure well and without complication. Wear good shoes    No orders of the defined types were placed in this encounter. No orders of the defined types were placed in this encounter.        RTC in 9 weeks    3/12/2020

## 2020-05-14 ENCOUNTER — OFFICE VISIT (OUTPATIENT)
Dept: PODIATRY | Age: 60
End: 2020-05-14
Payer: COMMERCIAL

## 2020-05-14 VITALS — BODY MASS INDEX: 28.93 KG/M2 | WEIGHT: 180 LBS | HEIGHT: 66 IN

## 2020-05-14 PROCEDURE — 99213 OFFICE O/P EST LOW 20 MIN: CPT | Performed by: PODIATRIST

## 2020-05-14 ASSESSMENT — ENCOUNTER SYMPTOMS
DIARRHEA: 0
VOMITING: 0
NAUSEA: 0
COLOR CHANGE: 0
CONSTIPATION: 0

## 2020-05-14 NOTE — PROGRESS NOTES
[facility]  Return Patient    Chief Complaint:   Chief Complaint   Patient presents with    Nail Problem     right hallux toenail/last saw Beryle Rainbow WEN 4/7/2020 (televisit)    Callouses     b/l feet        HPI: Nella Enriquez is a 61 y.o. female who presents to the office today for follow up of painful callouses, pain right foot and left, growing rapidly. Periodic debridements help. Painful at work. Works on her feet at [employer]. Currently denies F/C/N/V. Allergies   Allergen Reactions    Codeine Other (See Comments)     Stomach pain        Past Medical History:   Diagnosis Date    DDD (degenerative disc disease), thoracolumbar     Osteoarthritis        Prior to Admission medications    Medication Sig Start Date End Date Taking? Authorizing Provider   sertraline (ZOLOFT) 50 MG tablet Take 50 mg by mouth daily   Yes Historical Provider, MD   traMADol (ULTRAM) 50 MG tablet Take 50 mg by mouth.  1/7/20  Yes Historical Provider, MD   levothyroxine (SYNTHROID) 75 MCG tablet TAKE 1 TABLET BY MOUTH, ON AN EMPTY STOMACH, ONE TIME A DAY IN THE MORNING. 1/14/19  Yes Historical Provider, MD   celecoxib (CELEBREX) 200 MG capsule Take 200 mg by mouth 6/27/18  Yes Historical Provider, MD       Past Surgical History:   Procedure Laterality Date    APPENDECTOMY      CARPAL TUNNEL RELEASE      FOOT SURGERY      GALLBLADDER SURGERY         Family History   Problem Relation Age of Onset    Arthritis Mother     Cancer Mother     Arthritis Father     Arthritis Brother     Diabetes Maternal Uncle     Arthritis Maternal Grandmother     Cancer Maternal Grandmother     Diabetes Maternal Grandmother     Cancer Maternal Grandfather        Social History     Tobacco Use    Smoking status: Current Every Day Smoker     Packs/day: 0.50     Years: 40.00     Pack years: 20.00     Types: Cigarettes    Smokeless tobacco: Never Used   Substance Use Topics    Alcohol use: Not on file       Review of Systems

## 2020-10-08 ENCOUNTER — OFFICE VISIT (OUTPATIENT)
Dept: PODIATRY | Age: 60
End: 2020-10-08
Payer: COMMERCIAL

## 2020-10-08 VITALS — HEIGHT: 66 IN | WEIGHT: 185 LBS | BODY MASS INDEX: 29.73 KG/M2

## 2020-10-08 PROCEDURE — 99213 OFFICE O/P EST LOW 20 MIN: CPT | Performed by: PODIATRIST

## 2020-10-08 ASSESSMENT — ENCOUNTER SYMPTOMS
DIARRHEA: 0
NAUSEA: 0
COLOR CHANGE: 0
VOMITING: 0
CONSTIPATION: 0

## 2020-10-08 NOTE — PROGRESS NOTES
[address] [place] [address] Podiatry  Return Patient    Chief Complaint:   Chief Complaint   Patient presents with   Cheryln Comfort     b/l     Nail Problem     b/l nail trim        HPI: Alejandra Mitchell is a 61 y.o. female who presents to the office today for follow up of painful callouses, pain right foot and left, growing rapidly. Periodic debridements help. Painful at work. Works on her feet at [employer]. Currently denies F/C/N/V. Allergies   Allergen Reactions    Codeine Other (See Comments)     Stomach pain        Past Medical History:   Diagnosis Date    DDD (degenerative disc disease), thoracolumbar     Osteoarthritis        Prior to Admission medications    Medication Sig Start Date End Date Taking? Authorizing Provider   sertraline (ZOLOFT) 50 MG tablet Take 50 mg by mouth daily   Yes Historical Provider, MD   traMADol (ULTRAM) 50 MG tablet Take 50 mg by mouth.  1/7/20  Yes Historical Provider, MD   levothyroxine (SYNTHROID) 75 MCG tablet TAKE 1 TABLET BY MOUTH, ON AN EMPTY STOMACH, ONE TIME A DAY IN THE MORNING. 1/14/19  Yes Historical Provider, MD   celecoxib (CELEBREX) 200 MG capsule Take 200 mg by mouth 6/27/18  Yes Historical Provider, MD       Past Surgical History:   Procedure Laterality Date    APPENDECTOMY      CARPAL TUNNEL RELEASE      FOOT SURGERY      GALLBLADDER SURGERY         Family History   Problem Relation Age of Onset    Arthritis Mother     Cancer Mother     Arthritis Father     Arthritis Brother     Diabetes Maternal Uncle     Arthritis Maternal Grandmother     Cancer Maternal Grandmother     Diabetes Maternal Grandmother     Cancer Maternal Grandfather        Social History     Tobacco Use    Smoking status: Current Every Day Smoker     Packs/day: 0.50     Years: 40.00     Pack years: 20.00     Types: Cigarettes    Smokeless tobacco: Never Used   Substance Use Topics    Alcohol use: Not on file       Review of Systems   Constitutional: Negative for chills, diaphoresis, will leave in place for 24-48 hours and than remove. The patient tolerated the procedure well and without complication. Wear good shoes    No orders of the defined types were placed in this encounter. No orders of the defined types were placed in this encounter.        RTC in 9 weeks    10/8/2020

## 2020-12-17 ENCOUNTER — OFFICE VISIT (OUTPATIENT)
Dept: PODIATRY | Age: 60
End: 2020-12-17
Payer: COMMERCIAL

## 2020-12-17 VITALS — BODY MASS INDEX: 28.93 KG/M2 | HEIGHT: 66 IN | WEIGHT: 180 LBS

## 2020-12-17 PROCEDURE — 99213 OFFICE O/P EST LOW 20 MIN: CPT | Performed by: PODIATRIST

## 2020-12-17 ASSESSMENT — ENCOUNTER SYMPTOMS
VOMITING: 0
NAUSEA: 0
COLOR CHANGE: 0
CONSTIPATION: 0
DIARRHEA: 0

## 2020-12-17 NOTE — PROGRESS NOTES
[facility]  Return Patient    Chief Complaint:   Chief Complaint   Patient presents with   Grisel De La Rosa     b/kentrell       HPI: Varsha Ocampo is a 61 y.o. female who presents to the office today for follow up of painful callouses, pain right foot and left, growing rapidly. Periodic debridements help. Painful at work. Works on her feet at [employer]. Currently denies F/C/N/V. Allergies   Allergen Reactions    Codeine Other (See Comments)     Stomach pain        Past Medical History:   Diagnosis Date    DDD (degenerative disc disease), thoracolumbar     Osteoarthritis        Prior to Admission medications    Medication Sig Start Date End Date Taking? Authorizing Provider   vitamin D3 (CHOLECALCIFEROL) 125 MCG (5000 UT) TABS tablet Take 5,000 Units by mouth daily   Yes Historical Provider, MD   sertraline (ZOLOFT) 50 MG tablet Take 50 mg by mouth daily   Yes Historical Provider, MD   traMADol (ULTRAM) 50 MG tablet Take 50 mg by mouth.  1/7/20  Yes Historical Provider, MD   levothyroxine (SYNTHROID) 75 MCG tablet TAKE 1 TABLET BY MOUTH, ON AN EMPTY STOMACH, ONE TIME A DAY IN THE MORNING. 1/14/19  Yes Historical Provider, MD   celecoxib (CELEBREX) 200 MG capsule Take 200 mg by mouth 6/27/18  Yes Historical Provider, MD       Past Surgical History:   Procedure Laterality Date    APPENDECTOMY      CARPAL TUNNEL RELEASE      FOOT SURGERY      GALLBLADDER SURGERY         Family History   Problem Relation Age of Onset    Arthritis Mother     Cancer Mother     Arthritis Father     Arthritis Brother     Diabetes Maternal Uncle     Arthritis Maternal Grandmother     Cancer Maternal Grandmother     Diabetes Maternal Grandmother     Cancer Maternal Grandfather        Social History     Tobacco Use    Smoking status: Current Every Day Smoker     Packs/day: 0.50     Years: 40.00     Pack years: 20.00     Types: Cigarettes    Smokeless tobacco: Never Used   Substance Use Topics    Alcohol use: Not on file       Review of Systems   Constitutional: Negative for chills, diaphoresis, fatigue, fever and unexpected weight change. Cardiovascular: Negative for leg swelling. Gastrointestinal: Negative for constipation, diarrhea, nausea and vomiting. Musculoskeletal: Positive for gait problem. Negative for arthralgias and joint swelling. Skin: Negative for color change, pallor, rash and wound. Neurological: Negative for weakness and numbness. Lower Extremity Physical Examination:     Vitals: There were no vitals filed for this visit. General: AAO x 3 in NAD. Vascular: DP and PT pulses palpable 2/4, Bilateral.  CFT <3 seconds, Bilateral.  Hair growth present to the level of the digits, Bilateral.  Edema absent, Bilateral.  Varicosities absent, Bilateral. Erythema absent, Bilateral    Neurological:  Sensation present to light touch to level of digits, Bilateral.    Musculoskeletal: Muscle strength 5/5, Bilateral.  Decreased fat pad sub 5th met bilateral, adductovarus left 5th toe. No Pain dorsal and medial navicular right, and resolved at the T-N joint, insertion of the PT tendon also. Integument: Warm, dry, supple, Bilateral.  Hyperkeratosis with hard central core, 3 under right 5th met head, one right sub 1st met head, left 5th toe. Radiographs: 3 views right Foot:  Severe degenerative changes at the talo-navicular joint. Fracture of the dorsal portion of the navicular, non-displaced and age indeterminate Generalized osteopenia. Asessment: Patient is a 61 y.o. female with:   1. Benign neoplasm of skin of foot, right    2. Benign neoplasm of skin of foot, left    3. Pain in both feet    4. Acquired plantar keratoderma          Plan: Patient examined and evaluated. Current condition and treatment options discussed in detail. Verbal and written instructions given to patient. Contact office with any questions/problems/concerns.      The lesion(s) was partially debrided, enucleated, and silver nitrate was applied with an apperature pad under occlusion. The patient will leave in place for 24-48 hours and than remove. The patient tolerated the procedure well and without complication. Wear good shoes    No orders of the defined types were placed in this encounter. No orders of the defined types were placed in this encounter.        RTC in 9 weeks    12/17/2020

## 2021-02-18 ENCOUNTER — OFFICE VISIT (OUTPATIENT)
Dept: PODIATRY | Age: 61
End: 2021-02-18
Payer: COMMERCIAL

## 2021-02-18 VITALS — HEIGHT: 66 IN | BODY MASS INDEX: 28.93 KG/M2 | WEIGHT: 180 LBS

## 2021-02-18 DIAGNOSIS — D23.72 BENIGN NEOPLASM OF SKIN OF FOOT, LEFT: ICD-10-CM

## 2021-02-18 DIAGNOSIS — L85.1 ACQUIRED PLANTAR KERATODERMA: ICD-10-CM

## 2021-02-18 DIAGNOSIS — D23.71 BENIGN NEOPLASM OF SKIN OF FOOT, RIGHT: Primary | ICD-10-CM

## 2021-02-18 DIAGNOSIS — M79.672 PAIN IN BOTH FEET: ICD-10-CM

## 2021-02-18 DIAGNOSIS — M79.671 PAIN IN BOTH FEET: ICD-10-CM

## 2021-02-18 PROCEDURE — 99213 OFFICE O/P EST LOW 20 MIN: CPT | Performed by: PODIATRIST

## 2021-02-18 RX ORDER — LEVOTHYROXINE SODIUM 0.1 MG/1
TABLET ORAL
COMMUNITY
Start: 2021-01-07

## 2021-02-18 ASSESSMENT — ENCOUNTER SYMPTOMS
CONSTIPATION: 0
COLOR CHANGE: 0
NAUSEA: 0
DIARRHEA: 0
VOMITING: 0

## 2021-02-18 NOTE — PROGRESS NOTES
[facility]  Return Patient    Chief Complaint:   Chief Complaint   Patient presents with    Callouses     Bilateral feet / Last seen Les Jennings, APRN-CNP 12/16/20       HPI: Veronika Loaiza is a 61 y.o. female who presents to the office today for follow up of painful callouses, pain right foot and left, growing rapidly. Periodic debridements help. Painful at work. Works on her feet at [employer]. Currently denies F/C/N/V. Allergies   Allergen Reactions    Codeine Other (See Comments)     Stomach pain        Past Medical History:   Diagnosis Date    DDD (degenerative disc disease), thoracolumbar     Osteoarthritis        Prior to Admission medications    Medication Sig Start Date End Date Taking? Authorizing Provider   levothyroxine (SYNTHROID) 100 MCG tablet TAKE 1 TABLET BY MOUTH EVERY DAY 1/7/21  Yes Historical Provider, MD   vitamin D3 (CHOLECALCIFEROL) 125 MCG (5000 UT) TABS tablet Take 5,000 Units by mouth daily   Yes Historical Provider, MD   sertraline (ZOLOFT) 50 MG tablet Take 50 mg by mouth daily   Yes Historical Provider, MD   traMADol (ULTRAM) 50 MG tablet Take 50 mg by mouth.  1/7/20  Yes Historical Provider, MD   celecoxib (CELEBREX) 200 MG capsule Take 200 mg by mouth 6/27/18  Yes Historical Provider, MD       Past Surgical History:   Procedure Laterality Date    APPENDECTOMY      CARPAL TUNNEL RELEASE      FOOT SURGERY      GALLBLADDER SURGERY         Family History   Problem Relation Age of Onset    Arthritis Mother     Cancer Mother     Arthritis Father     Arthritis Brother     Diabetes Maternal Uncle     Arthritis Maternal Grandmother     Cancer Maternal Grandmother     Diabetes Maternal Grandmother     Cancer Maternal Grandfather        Social History     Tobacco Use    Smoking status: Current Every Day Smoker     Packs/day: 0.50     Years: 40.00     Pack years: 20.00     Types: Cigarettes    Smokeless tobacco: Never Used   Substance Use Topics    Alcohol use: Not on file       Review of Systems   Constitutional: Negative for chills, diaphoresis, fatigue, fever and unexpected weight change. Cardiovascular: Negative for leg swelling. Gastrointestinal: Negative for constipation, diarrhea, nausea and vomiting. Musculoskeletal: Positive for gait problem. Negative for arthralgias and joint swelling. Skin: Negative for color change, pallor, rash and wound. Neurological: Negative for weakness and numbness. Lower Extremity Physical Examination:     Vitals: There were no vitals filed for this visit. General: AAO x 3 in NAD. Vascular: DP and PT pulses palpable 2/4, Bilateral.  CFT <3 seconds, Bilateral.  Hair growth present to the level of the digits, Bilateral.  Edema absent, Bilateral.  Varicosities absent, Bilateral. Erythema absent, Bilateral    Neurological:  Sensation present to light touch to level of digits, Bilateral.    Musculoskeletal: Muscle strength 5/5, Bilateral.  Decreased fat pad sub 5th met bilateral, adductovarus left 5th toe. No Pain dorsal and medial navicular right, and resolved at the T-N joint, insertion of the PT tendon also. Integument: Warm, dry, supple, Bilateral.  Hyperkeratosis with hard central core, 3 under right 5th met head, one right sub 1st met head, left 5th toe. Radiographs: 3 views right Foot:  Severe degenerative changes at the talo-navicular joint. Fracture of the dorsal portion of the navicular, non-displaced and age indeterminate Generalized osteopenia. Asessment: Patient is a 61 y.o. female with:   1. Benign neoplasm of skin of foot, right    2. Benign neoplasm of skin of foot, left    3. Pain in both feet    4. Acquired plantar keratoderma          Plan: Patient examined and evaluated. Current condition and treatment options discussed in detail. Verbal and written instructions given to patient. Contact office with any questions/problems/concerns.      The lesion(s) was partially debrided, enucleated, and silver nitrate was applied with an apperature pad under occlusion. The patient will leave in place for 24-48 hours and than remove. The patient tolerated the procedure well and without complication. Wear good shoes    No orders of the defined types were placed in this encounter. No orders of the defined types were placed in this encounter.        RTC in 9 weeks    2/18/2021

## 2021-04-22 ENCOUNTER — OFFICE VISIT (OUTPATIENT)
Dept: PODIATRY | Age: 61
End: 2021-04-22
Payer: COMMERCIAL

## 2021-04-22 VITALS — HEIGHT: 65 IN | WEIGHT: 180 LBS | BODY MASS INDEX: 29.99 KG/M2

## 2021-04-22 DIAGNOSIS — M79.671 PAIN IN BOTH FEET: ICD-10-CM

## 2021-04-22 DIAGNOSIS — D23.71 BENIGN NEOPLASM OF SKIN OF FOOT, RIGHT: Primary | ICD-10-CM

## 2021-04-22 DIAGNOSIS — D23.72 BENIGN NEOPLASM OF SKIN OF FOOT, LEFT: ICD-10-CM

## 2021-04-22 DIAGNOSIS — M79.672 PAIN IN BOTH FEET: ICD-10-CM

## 2021-04-22 PROCEDURE — 99213 OFFICE O/P EST LOW 20 MIN: CPT | Performed by: PODIATRIST

## 2021-04-22 ASSESSMENT — ENCOUNTER SYMPTOMS
COLOR CHANGE: 0
DIARRHEA: 0
CONSTIPATION: 0
VOMITING: 0
NAUSEA: 0

## 2021-04-22 NOTE — PROGRESS NOTES
[address] [place] [address] Podiatry  Return Patient    Chief Complaint:   Chief Complaint   Patient presents with   Mauricio shaffer/kentrell        HPI: Mychal Stokes is a 61 y.o. female who presents to the office today for follow up of painful callouses, pain right foot and left, growing rapidly. Periodic debridements help. Painful at work. Works on her feet at [employer]. Currently denies F/C/N/V. Allergies   Allergen Reactions    Codeine Other (See Comments)     Stomach pain        Past Medical History:   Diagnosis Date    DDD (degenerative disc disease), thoracolumbar     Osteoarthritis        Prior to Admission medications    Medication Sig Start Date End Date Taking? Authorizing Provider   levothyroxine (SYNTHROID) 100 MCG tablet TAKE 1 TABLET BY MOUTH EVERY DAY 1/7/21  Yes Historical Provider, MD   vitamin D3 (CHOLECALCIFEROL) 125 MCG (5000 UT) TABS tablet Take 5,000 Units by mouth daily   Yes Historical Provider, MD   sertraline (ZOLOFT) 50 MG tablet Take 50 mg by mouth daily   Yes Historical Provider, MD   traMADol (ULTRAM) 50 MG tablet Take 50 mg by mouth.  1/7/20  Yes Historical Provider, MD   celecoxib (CELEBREX) 200 MG capsule Take 200 mg by mouth 6/27/18  Yes Historical Provider, MD       Past Surgical History:   Procedure Laterality Date    APPENDECTOMY      CARPAL TUNNEL RELEASE      FOOT SURGERY      GALLBLADDER SURGERY         Family History   Problem Relation Age of Onset    Arthritis Mother     Cancer Mother     Arthritis Father     Arthritis Brother     Diabetes Maternal Uncle     Arthritis Maternal Grandmother     Cancer Maternal Grandmother     Diabetes Maternal Grandmother     Cancer Maternal Grandfather        Social History     Tobacco Use    Smoking status: Current Every Day Smoker     Packs/day: 0.50     Years: 40.00     Pack years: 20.00     Types: Cigarettes    Smokeless tobacco: Never Used   Substance Use Topics    Alcohol use: Not on file       Review of Systems Constitutional: Negative for chills, diaphoresis, fatigue, fever and unexpected weight change. Cardiovascular: Negative for leg swelling. Gastrointestinal: Negative for constipation, diarrhea, nausea and vomiting. Musculoskeletal: Positive for gait problem. Negative for arthralgias and joint swelling. Skin: Negative for color change, pallor, rash and wound. Neurological: Negative for weakness and numbness. Lower Extremity Physical Examination:     Vitals: There were no vitals filed for this visit. General: AAO x 3 in NAD. Vascular: DP and PT pulses palpable 2/4, Bilateral.  CFT <3 seconds, Bilateral.  Hair growth present to the level of the digits, Bilateral.  Edema absent, Bilateral.  Varicosities absent, Bilateral. Erythema absent, Bilateral    Neurological:  Sensation present to light touch to level of digits, Bilateral.    Musculoskeletal: Muscle strength 5/5, Bilateral.  Decreased fat pad sub 5th met bilateral, adductovarus left 5th toe. No Pain dorsal and medial navicular right, and resolved at the T-N joint, insertion of the PT tendon also. Integument: Warm, dry, supple, Bilateral.  Hyperkeratosis with hard central core, 3 under right 5th met head, one right sub 1st met head, left 5th toe. Radiographs: 3 views right Foot:  Severe degenerative changes at the talo-navicular joint. Fracture of the dorsal portion of the navicular, non-displaced and age indeterminate Generalized osteopenia. Asessment: Patient is a 61 y.o. female with:   1. Benign neoplasm of skin of foot, right    2. Benign neoplasm of skin of foot, left    3. Pain in both feet          Plan: Patient examined and evaluated. Current condition and treatment options discussed in detail. Verbal and written instructions given to patient. Contact office with any questions/problems/concerns.      The lesion(s) was partially debrided, enucleated, and silver nitrate was applied with an apperature pad under occlusion. The patient will leave in place for 24-48 hours and than remove. The patient tolerated the procedure well and without complication. Wear good shoes    No orders of the defined types were placed in this encounter. No orders of the defined types were placed in this encounter.        RTC in 9 weeks    4/22/2021

## 2021-06-24 ENCOUNTER — OFFICE VISIT (OUTPATIENT)
Dept: PODIATRY | Age: 61
End: 2021-06-24
Payer: COMMERCIAL

## 2021-06-24 VITALS — HEIGHT: 65 IN | WEIGHT: 180 LBS | BODY MASS INDEX: 29.99 KG/M2

## 2021-06-24 DIAGNOSIS — L85.1 ACQUIRED PLANTAR KERATODERMA: ICD-10-CM

## 2021-06-24 DIAGNOSIS — D23.72 BENIGN NEOPLASM OF SKIN OF FOOT, LEFT: ICD-10-CM

## 2021-06-24 DIAGNOSIS — M79.671 PAIN IN BOTH FEET: ICD-10-CM

## 2021-06-24 DIAGNOSIS — M79.672 PAIN IN BOTH FEET: ICD-10-CM

## 2021-06-24 DIAGNOSIS — D23.71 BENIGN NEOPLASM OF SKIN OF FOOT, RIGHT: Primary | ICD-10-CM

## 2021-06-24 PROCEDURE — 99213 OFFICE O/P EST LOW 20 MIN: CPT | Performed by: PODIATRIST

## 2021-06-24 ASSESSMENT — ENCOUNTER SYMPTOMS
CONSTIPATION: 0
VOMITING: 0
COLOR CHANGE: 0
DIARRHEA: 0
NAUSEA: 0

## 2021-06-24 NOTE — PROGRESS NOTES
[facility] Podiatry  Return Patient    Chief Complaint:   Chief Complaint   Patient presents with   Lou Ayoub / Last seen Opal Stack, APRN-CNP 03/03/21       HPI: Nisha Correa is a 64 y.o. female who presents to the office today for follow up of painful callouses, pain right foot and left, growing rapidly. Periodic debridements help. Painful at work. Works on her feet at [employer]. Currently denies F/C/N/V. Allergies   Allergen Reactions    Codeine Other (See Comments)     Stomach pain        Past Medical History:   Diagnosis Date    DDD (degenerative disc disease), thoracolumbar     Osteoarthritis        Prior to Admission medications    Medication Sig Start Date End Date Taking? Authorizing Provider   levothyroxine (SYNTHROID) 100 MCG tablet TAKE 1 TABLET BY MOUTH EVERY DAY 1/7/21  Yes Historical Provider, MD   vitamin D3 (CHOLECALCIFEROL) 125 MCG (5000 UT) TABS tablet Take 5,000 Units by mouth daily   Yes Historical Provider, MD   sertraline (ZOLOFT) 50 MG tablet Take 50 mg by mouth daily   Yes Historical Provider, MD   traMADol (ULTRAM) 50 MG tablet Take 50 mg by mouth.  1/7/20  Yes Historical Provider, MD   celecoxib (CELEBREX) 200 MG capsule Take 200 mg by mouth 6/27/18  Yes Historical Provider, MD       Past Surgical History:   Procedure Laterality Date    APPENDECTOMY      CARPAL TUNNEL RELEASE      FOOT SURGERY      GALLBLADDER SURGERY         Family History   Problem Relation Age of Onset    Arthritis Mother     Cancer Mother     Arthritis Father     Arthritis Brother     Diabetes Maternal Uncle     Arthritis Maternal Grandmother     Cancer Maternal Grandmother     Diabetes Maternal Grandmother     Cancer Maternal Grandfather        Social History     Tobacco Use    Smoking status: Current Every Day Smoker     Packs/day: 0.50     Years: 40.00     Pack years: 20.00     Types: Cigarettes    Smokeless tobacco: Never Used   Substance Use Topics    Alcohol use: Not on file       Review of Systems   Constitutional: Negative for chills, diaphoresis, fatigue, fever and unexpected weight change. Cardiovascular: Negative for leg swelling. Gastrointestinal: Negative for constipation, diarrhea, nausea and vomiting. Musculoskeletal: Positive for gait problem. Negative for arthralgias and joint swelling. Skin: Negative for color change, pallor, rash and wound. Neurological: Negative for weakness and numbness. Lower Extremity Physical Examination:     Vitals: There were no vitals filed for this visit. General: AAO x 3 in NAD. Vascular: DP and PT pulses palpable 2/4, Bilateral.  CFT <3 seconds, Bilateral.  Hair growth present to the level of the digits, Bilateral.  Edema absent, Bilateral.  Varicosities absent, Bilateral. Erythema absent, Bilateral    Neurological:  Sensation present to light touch to level of digits, Bilateral.    Musculoskeletal: Muscle strength 5/5, Bilateral.  Decreased fat pad sub 5th met bilateral, adductovarus left 5th toe. No Pain dorsal and medial navicular right, and resolved at the T-N joint, insertion of the PT tendon also. Integument: Warm, dry, supple, Bilateral.  Hyperkeratosis with hard central core, 3 under right 5th met head, one right sub 1st met head, left 5th toe. Radiographs: 3 views right Foot:  Severe degenerative changes at the talo-navicular joint. Fracture of the dorsal portion of the navicular, non-displaced and age indeterminate Generalized osteopenia. Asessment: Patient is a 64 y.o. female with:   1. Benign neoplasm of skin of foot, right    2. Benign neoplasm of skin of foot, left    3. Pain in both feet    4. Acquired plantar keratoderma          Plan: Patient examined and evaluated. Current condition and treatment options discussed in detail. Verbal and written instructions given to patient. Contact office with any questions/problems/concerns.      The lesion(s) was partially debrided, enucleated, and silver nitrate was applied with an apperature pad under occlusion. The patient will leave in place for 24-48 hours and than remove. The patient tolerated the procedure well and without complication. Wear good shoes    No orders of the defined types were placed in this encounter. No orders of the defined types were placed in this encounter.        RTC in 9 weeks    6/24/2021

## 2021-08-26 ENCOUNTER — OFFICE VISIT (OUTPATIENT)
Dept: PODIATRY | Age: 61
End: 2021-08-26
Payer: COMMERCIAL

## 2021-08-26 VITALS — WEIGHT: 180 LBS | BODY MASS INDEX: 29.95 KG/M2

## 2021-08-26 DIAGNOSIS — M79.672 PAIN IN BOTH FEET: ICD-10-CM

## 2021-08-26 DIAGNOSIS — M79.671 PAIN IN BOTH FEET: ICD-10-CM

## 2021-08-26 DIAGNOSIS — L85.1 ACQUIRED PLANTAR KERATODERMA: ICD-10-CM

## 2021-08-26 DIAGNOSIS — D23.72 BENIGN NEOPLASM OF SKIN OF FOOT, LEFT: ICD-10-CM

## 2021-08-26 DIAGNOSIS — D23.71 BENIGN NEOPLASM OF SKIN OF FOOT, RIGHT: Primary | ICD-10-CM

## 2021-08-26 PROCEDURE — 99213 OFFICE O/P EST LOW 20 MIN: CPT | Performed by: PODIATRIST

## 2021-08-26 ASSESSMENT — ENCOUNTER SYMPTOMS
CONSTIPATION: 0
VOMITING: 0
COLOR CHANGE: 0
DIARRHEA: 0
NAUSEA: 0

## 2021-10-28 ENCOUNTER — OFFICE VISIT (OUTPATIENT)
Dept: PODIATRY | Age: 61
End: 2021-10-28
Payer: COMMERCIAL

## 2021-10-28 VITALS — WEIGHT: 180 LBS | BODY MASS INDEX: 29.99 KG/M2 | HEIGHT: 65 IN

## 2021-10-28 DIAGNOSIS — M79.671 PAIN IN BOTH FEET: ICD-10-CM

## 2021-10-28 DIAGNOSIS — D23.72 BENIGN NEOPLASM OF SKIN OF FOOT, LEFT: ICD-10-CM

## 2021-10-28 DIAGNOSIS — D23.71 BENIGN NEOPLASM OF SKIN OF FOOT, RIGHT: Primary | ICD-10-CM

## 2021-10-28 DIAGNOSIS — M79.672 PAIN IN BOTH FEET: ICD-10-CM

## 2021-10-28 DIAGNOSIS — L85.1 ACQUIRED PLANTAR KERATODERMA: ICD-10-CM

## 2021-10-28 PROCEDURE — 17110 DESTRUCTION B9 LES UP TO 14: CPT | Performed by: PODIATRIST

## 2021-10-28 RX ORDER — CYCLOBENZAPRINE HCL 10 MG
TABLET ORAL
COMMUNITY
Start: 2021-10-07

## 2021-10-28 ASSESSMENT — ENCOUNTER SYMPTOMS
VOMITING: 0
DIARRHEA: 0
NAUSEA: 0
CONSTIPATION: 0
COLOR CHANGE: 0

## 2021-10-28 NOTE — PROGRESS NOTES
[facility] Podiatry  Return Patient    Chief Complaint:   Chief Complaint   Patient presents with    Nail Problem     b/l nail trim, last seen Bubba Galvez APRN CNP 10/13/21    Callouses     b/l       HPI: Scott Carr is a 64 y.o. female who presents to the office today for follow up of painful callouses, pain right foot and left, growing rapidly. Periodic debridements help. Painful at work. Works on her feet at [employer]. Currently denies F/C/N/V. Allergies   Allergen Reactions    Codeine Other (See Comments)     Stomach pain        Past Medical History:   Diagnosis Date    DDD (degenerative disc disease), thoracolumbar     Osteoarthritis        Prior to Admission medications    Medication Sig Start Date End Date Taking? Authorizing Provider   cyclobenzaprine (FLEXERIL) 10 MG tablet TAKE 1 TABLET BY MOUTH EVERY EIGHT HOURS AS NEEDED FOR MUSCLE SPASM 10/7/21  Yes Historical Provider, MD   levothyroxine (SYNTHROID) 100 MCG tablet TAKE 1 TABLET BY MOUTH EVERY DAY 1/7/21  Yes Historical Provider, MD   vitamin D3 (CHOLECALCIFEROL) 125 MCG (5000 UT) TABS tablet Take 5,000 Units by mouth daily   Yes Historical Provider, MD   sertraline (ZOLOFT) 50 MG tablet Take 50 mg by mouth daily   Yes Historical Provider, MD   traMADol (ULTRAM) 50 MG tablet Take 50 mg by mouth.  1/7/20  Yes Historical Provider, MD   celecoxib (CELEBREX) 200 MG capsule Take 200 mg by mouth 6/27/18  Yes Historical Provider, MD       Past Surgical History:   Procedure Laterality Date    APPENDECTOMY      CARPAL TUNNEL RELEASE      FOOT SURGERY      GALLBLADDER SURGERY         Family History   Problem Relation Age of Onset    Arthritis Mother     Cancer Mother     Arthritis Father     Arthritis Brother     Diabetes Maternal Uncle     Arthritis Maternal Grandmother     Cancer Maternal Grandmother     Diabetes Maternal Grandmother     Cancer Maternal Grandfather        Social History     Tobacco Use    Smoking status: Current Every Day Smoker     Packs/day: 0.50     Years: 40.00     Pack years: 20.00     Types: Cigarettes    Smokeless tobacco: Never Used   Substance Use Topics    Alcohol use: Not on file       Review of Systems   Constitutional: Negative for chills, diaphoresis, fatigue, fever and unexpected weight change. Cardiovascular: Negative for leg swelling. Gastrointestinal: Negative for constipation, diarrhea, nausea and vomiting. Musculoskeletal: Positive for gait problem. Negative for arthralgias and joint swelling. Skin: Negative for color change, pallor, rash and wound. Neurological: Negative for weakness and numbness. Lower Extremity Physical Examination:     Vitals: There were no vitals filed for this visit. General: AAO x 3 in NAD. Vascular: DP and PT pulses palpable 2/4, Bilateral.  CFT <3 seconds, Bilateral.  Hair growth present to the level of the digits, Bilateral.  Edema absent, Bilateral.  Varicosities absent, Bilateral. Erythema absent, Bilateral    Neurological:  Sensation present to light touch to level of digits, Bilateral.    Musculoskeletal: Muscle strength 5/5, Bilateral.  Decreased fat pad sub 5th met bilateral, adductovarus left 5th toe. No Pain dorsal and medial navicular right, and resolved at the T-N joint, insertion of the PT tendon also. Integument: Warm, dry, supple, Bilateral.  Hyperkeratosis with hard central core, 3 under right 5th met head, one right sub 1st met head, left 5th toe. Radiographs: 3 views right Foot:  Severe degenerative changes at the talo-navicular joint. Fracture of the dorsal portion of the navicular, non-displaced and age indeterminate Generalized osteopenia. Asessment: Patient is a 64 y.o. female with:   1. Benign neoplasm of skin of foot, right    2. Benign neoplasm of skin of foot, left    3. Pain in both feet    4. Acquired plantar keratoderma          Plan: Patient examined and evaluated.   Current condition and treatment options

## 2021-12-30 ENCOUNTER — OFFICE VISIT (OUTPATIENT)
Dept: PODIATRY | Age: 61
End: 2021-12-30
Payer: COMMERCIAL

## 2021-12-30 VITALS — HEIGHT: 65 IN | BODY MASS INDEX: 29.99 KG/M2 | WEIGHT: 180 LBS

## 2021-12-30 DIAGNOSIS — M79.672 PAIN IN BOTH FEET: ICD-10-CM

## 2021-12-30 DIAGNOSIS — L85.1 ACQUIRED PLANTAR KERATODERMA: ICD-10-CM

## 2021-12-30 DIAGNOSIS — M79.671 PAIN IN BOTH FEET: ICD-10-CM

## 2021-12-30 DIAGNOSIS — D23.72 BENIGN NEOPLASM OF SKIN OF FOOT, LEFT: ICD-10-CM

## 2021-12-30 DIAGNOSIS — D23.71 BENIGN NEOPLASM OF SKIN OF FOOT, RIGHT: Primary | ICD-10-CM

## 2021-12-30 PROCEDURE — 99213 OFFICE O/P EST LOW 20 MIN: CPT | Performed by: PODIATRIST

## 2021-12-30 ASSESSMENT — ENCOUNTER SYMPTOMS
NAUSEA: 0
COLOR CHANGE: 0
DIARRHEA: 0
CONSTIPATION: 0
VOMITING: 0

## 2021-12-30 NOTE — PROGRESS NOTES
[facility]  Return Patient    Chief Complaint:   Chief Complaint   Patient presents with    Nail Problem     nail trim/last saw Mara Morel CNP 10/13/21    Callouses       HPI: Joshua Francis is a 64 y.o. female who presents to the office today for follow up of painful callouses, pain right foot and left, growing rapidly. Periodic debridements help. Painful at work. Works on her feet at [employer]. Currently denies F/C/N/V. Allergies   Allergen Reactions    Codeine Other (See Comments)     Stomach pain        Past Medical History:   Diagnosis Date    DDD (degenerative disc disease), thoracolumbar     Osteoarthritis        Prior to Admission medications    Medication Sig Start Date End Date Taking? Authorizing Provider   cyclobenzaprine (FLEXERIL) 10 MG tablet TAKE 1 TABLET BY MOUTH EVERY EIGHT HOURS AS NEEDED FOR MUSCLE SPASM 10/7/21  Yes Historical Provider, MD   levothyroxine (SYNTHROID) 100 MCG tablet TAKE 1 TABLET BY MOUTH EVERY DAY 1/7/21  Yes Historical Provider, MD   vitamin D3 (CHOLECALCIFEROL) 125 MCG (5000 UT) TABS tablet Take 5,000 Units by mouth daily   Yes Historical Provider, MD   sertraline (ZOLOFT) 50 MG tablet Take 50 mg by mouth daily   Yes Historical Provider, MD   traMADol (ULTRAM) 50 MG tablet Take 50 mg by mouth.  1/7/20  Yes Historical Provider, MD   celecoxib (CELEBREX) 200 MG capsule Take 200 mg by mouth 6/27/18  Yes Historical Provider, MD       Past Surgical History:   Procedure Laterality Date    APPENDECTOMY      CARPAL TUNNEL RELEASE      FOOT SURGERY      GALLBLADDER SURGERY         Family History   Problem Relation Age of Onset    Arthritis Mother     Cancer Mother     Arthritis Father     Arthritis Brother     Diabetes Maternal Uncle     Arthritis Maternal Grandmother     Cancer Maternal Grandmother     Diabetes Maternal Grandmother     Cancer Maternal Grandfather        Social History     Tobacco Use    Smoking status: Current Every Day Smoker Packs/day: 0.50     Years: 40.00     Pack years: 20.00     Types: Cigarettes    Smokeless tobacco: Never Used   Substance Use Topics    Alcohol use: Not on file       Review of Systems   Constitutional: Negative for chills, diaphoresis, fatigue, fever and unexpected weight change. Cardiovascular: Negative for leg swelling. Gastrointestinal: Negative for constipation, diarrhea, nausea and vomiting. Musculoskeletal: Positive for gait problem. Negative for arthralgias and joint swelling. Skin: Negative for color change, pallor, rash and wound. Neurological: Negative for weakness and numbness. Lower Extremity Physical Examination:     Vitals: There were no vitals filed for this visit. General: AAO x 3 in NAD. Vascular: DP and PT pulses palpable 2/4, Bilateral.  CFT <3 seconds, Bilateral.  Hair growth present to the level of the digits, Bilateral.  Edema absent, Bilateral.  Varicosities absent, Bilateral. Erythema absent, Bilateral    Neurological:  Sensation present to light touch to level of digits, Bilateral.    Musculoskeletal: Muscle strength 5/5, Bilateral.  Decreased fat pad sub 5th met bilateral, adductovarus left 5th toe. No Pain dorsal and medial navicular right, and resolved at the T-N joint, insertion of the PT tendon also. Integument: Warm, dry, supple, Bilateral.  Hyperkeratosis with hard central core, 3 under right 5th met head, one right sub 1st met head, left 5th toe. Radiographs: 3 views right Foot:  Severe degenerative changes at the talo-navicular joint. Fracture of the dorsal portion of the navicular, non-displaced and age indeterminate Generalized osteopenia. Asessment: Patient is a 64 y.o. female with:   1. Benign neoplasm of skin of foot, right    2. Benign neoplasm of skin of foot, left    3. Pain in both feet    4. Acquired plantar keratoderma          Plan: Patient examined and evaluated. Current condition and treatment options discussed in detail. Verbal and written instructions given to patient. Contact office with any questions/problems/concerns. The lesion(s) was partially debrided, enucleated, and silver nitrate was applied with an apperature pad under occlusion. The patient will leave in place for 24-48 hours and than remove. The patient tolerated the procedure well and without complication. Wear good shoes    No orders of the defined types were placed in this encounter. No orders of the defined types were placed in this encounter.        RTC in 9 weeks    12/30/2021

## 2022-03-03 ENCOUNTER — OFFICE VISIT (OUTPATIENT)
Dept: PODIATRY | Age: 62
End: 2022-03-03
Payer: COMMERCIAL

## 2022-03-03 VITALS — HEIGHT: 65 IN | WEIGHT: 180 LBS | BODY MASS INDEX: 29.99 KG/M2

## 2022-03-03 DIAGNOSIS — M79.672 PAIN IN BOTH FEET: ICD-10-CM

## 2022-03-03 DIAGNOSIS — M79.671 PAIN IN BOTH FEET: ICD-10-CM

## 2022-03-03 DIAGNOSIS — D23.71 BENIGN NEOPLASM OF SKIN OF FOOT, RIGHT: Primary | ICD-10-CM

## 2022-03-03 DIAGNOSIS — L85.1 ACQUIRED PLANTAR KERATODERMA: ICD-10-CM

## 2022-03-03 DIAGNOSIS — D23.72 BENIGN NEOPLASM OF SKIN OF FOOT, LEFT: ICD-10-CM

## 2022-03-03 PROCEDURE — 99999 PR OFFICE/OUTPT VISIT,PROCEDURE ONLY: CPT | Performed by: PODIATRIST

## 2022-03-03 PROCEDURE — 17110 DESTRUCTION B9 LES UP TO 14: CPT | Performed by: PODIATRIST

## 2022-03-03 ASSESSMENT — ENCOUNTER SYMPTOMS
CONSTIPATION: 0
DIARRHEA: 0
COLOR CHANGE: 0
VOMITING: 0
NAUSEA: 0

## 2022-03-03 NOTE — PROGRESS NOTES
[facility]  Return Patient    Chief Complaint:   Chief Complaint   Patient presents with    Nail Problem     b/l nail trim, last seen Gilmer Lugo VEL CNP 2/15/22    Callouses     b/l       HPI: Jessica Causey is a 64 y.o. female who presents to the office today for follow up of painful callouses, pain right foot and left, growing rapidly. Periodic debridements help. Painful at work. Works on her feet at [employer]. Currently denies F/C/N/V. Allergies   Allergen Reactions    Codeine Other (See Comments)     Stomach pain        Past Medical History:   Diagnosis Date    DDD (degenerative disc disease), thoracolumbar     Osteoarthritis        Prior to Admission medications    Medication Sig Start Date End Date Taking? Authorizing Provider   cyclobenzaprine (FLEXERIL) 10 MG tablet TAKE 1 TABLET BY MOUTH EVERY EIGHT HOURS AS NEEDED FOR MUSCLE SPASM 10/7/21  Yes Historical Provider, MD   levothyroxine (SYNTHROID) 100 MCG tablet TAKE 1 TABLET BY MOUTH EVERY DAY 1/7/21  Yes Historical Provider, MD   vitamin D3 (CHOLECALCIFEROL) 125 MCG (5000 UT) TABS tablet Take 5,000 Units by mouth daily   Yes Historical Provider, MD   sertraline (ZOLOFT) 50 MG tablet Take 50 mg by mouth daily   Yes Historical Provider, MD   traMADol (ULTRAM) 50 MG tablet Take 50 mg by mouth.  1/7/20  Yes Historical Provider, MD   celecoxib (CELEBREX) 200 MG capsule Take 200 mg by mouth 6/27/18  Yes Historical Provider, MD       Past Surgical History:   Procedure Laterality Date    APPENDECTOMY      CARPAL TUNNEL RELEASE      FOOT SURGERY      GALLBLADDER SURGERY         Family History   Problem Relation Age of Onset    Arthritis Mother     Cancer Mother     Arthritis Father     Arthritis Brother     Diabetes Maternal Uncle     Arthritis Maternal Grandmother     Cancer Maternal Grandmother     Diabetes Maternal Grandmother     Cancer Maternal Grandfather        Social History     Tobacco Use    Smoking status: Current Every Day Smoker     Packs/day: 0.50     Years: 40.00     Pack years: 20.00     Types: Cigarettes    Smokeless tobacco: Never Used   Substance Use Topics    Alcohol use: Not on file       Review of Systems   Constitutional: Negative for chills, diaphoresis, fatigue, fever and unexpected weight change. Cardiovascular: Negative for leg swelling. Gastrointestinal: Negative for constipation, diarrhea, nausea and vomiting. Musculoskeletal: Positive for gait problem. Negative for arthralgias and joint swelling. Skin: Negative for color change, pallor, rash and wound. Neurological: Negative for weakness and numbness. Lower Extremity Physical Examination:     Vitals: There were no vitals filed for this visit. General: AAO x 3 in NAD. Vascular: DP and PT pulses palpable 2/4, Bilateral.  CFT <3 seconds, Bilateral.  Hair growth present to the level of the digits, Bilateral.  Edema absent, Bilateral.  Varicosities absent, Bilateral. Erythema absent, Bilateral    Neurological:  Sensation present to light touch to level of digits, Bilateral.    Musculoskeletal: Muscle strength 5/5, Bilateral.  Decreased fat pad sub 5th met bilateral, adductovarus left 5th toe. No Pain dorsal and medial navicular right, and resolved at the T-N joint, insertion of the PT tendon also. Integument: Warm, dry, supple, Bilateral.  Hyperkeratosis with hard central core, 3 under right 5th met head, one right sub 1st met head, left 5th toe. Radiographs: 3 views right Foot:  Severe degenerative changes at the talo-navicular joint. Fracture of the dorsal portion of the navicular, non-displaced and age indeterminate Generalized osteopenia. Asessment: Patient is a 64 y.o. female with:   1. Benign neoplasm of skin of foot, right    2. Benign neoplasm of skin of foot, left    3. Pain in both feet    4. Acquired plantar keratoderma          Plan: Patient examined and evaluated.   Current condition and treatment options discussed in detail. Verbal and written instructions given to patient. Contact office with any questions/problems/concerns. The lesion(s) was partially debrided, enucleated, and silver nitrate was applied with an apperature pad under occlusion. The patient will leave in place for 24-48 hours and than remove. The patient tolerated the procedure well and without complication. Wear good shoes    Orders Placed This Encounter   Procedures    93003 - IN DESTRUCTION BENIGN LESIONS UP TO 14     No orders of the defined types were placed in this encounter.        RTC in 9 weeks    3/3/2022

## 2022-05-19 ENCOUNTER — OFFICE VISIT (OUTPATIENT)
Dept: PODIATRY | Age: 62
End: 2022-05-19
Payer: COMMERCIAL

## 2022-05-19 VITALS — HEIGHT: 65 IN | BODY MASS INDEX: 31.99 KG/M2 | WEIGHT: 192 LBS

## 2022-05-19 DIAGNOSIS — M79.671 PAIN IN BOTH FEET: ICD-10-CM

## 2022-05-19 DIAGNOSIS — D23.72 BENIGN NEOPLASM OF SKIN OF FOOT, LEFT: ICD-10-CM

## 2022-05-19 DIAGNOSIS — L85.1 ACQUIRED PLANTAR KERATODERMA: ICD-10-CM

## 2022-05-19 DIAGNOSIS — M79.672 PAIN IN BOTH FEET: ICD-10-CM

## 2022-05-19 DIAGNOSIS — D23.71 BENIGN NEOPLASM OF SKIN OF FOOT, RIGHT: Primary | ICD-10-CM

## 2022-05-19 PROCEDURE — 17110 DESTRUCTION B9 LES UP TO 14: CPT | Performed by: PODIATRIST

## 2022-05-19 PROCEDURE — 99999 PR OFFICE/OUTPT VISIT,PROCEDURE ONLY: CPT | Performed by: PODIATRIST

## 2022-05-19 RX ORDER — HYDROCHLOROTHIAZIDE 25 MG/1
TABLET ORAL
COMMUNITY
Start: 2022-05-17

## 2022-05-19 ASSESSMENT — ENCOUNTER SYMPTOMS
CONSTIPATION: 0
COLOR CHANGE: 0
VOMITING: 0
DIARRHEA: 0
NAUSEA: 0

## 2022-06-30 ENCOUNTER — OFFICE VISIT (OUTPATIENT)
Dept: PODIATRY | Age: 62
End: 2022-06-30
Payer: COMMERCIAL

## 2022-06-30 VITALS — HEIGHT: 62 IN | BODY MASS INDEX: 34.04 KG/M2 | WEIGHT: 185 LBS

## 2022-06-30 DIAGNOSIS — S92.502A CLOSED FRACTURE OF PHALANX OF LEFT SECOND TOE, INITIAL ENCOUNTER: Primary | ICD-10-CM

## 2022-06-30 DIAGNOSIS — M79.672 PAIN OF LEFT FOOT: ICD-10-CM

## 2022-06-30 DIAGNOSIS — R60.0 EDEMA OF LEFT FOOT: ICD-10-CM

## 2022-06-30 DIAGNOSIS — S92.502A CLOSED FRACTURE OF PHALANX OF LEFT THIRD TOE, INITIAL ENCOUNTER: ICD-10-CM

## 2022-06-30 PROCEDURE — 99213 OFFICE O/P EST LOW 20 MIN: CPT | Performed by: PODIATRIST

## 2022-06-30 PROCEDURE — L4360 PNEUMAT WALKING BOOT PRE CST: HCPCS | Performed by: PODIATRIST

## 2022-06-30 RX ORDER — AZITHROMYCIN 250 MG/1
TABLET, FILM COATED ORAL
COMMUNITY
Start: 2022-06-27 | End: 2022-07-21 | Stop reason: ALTCHOICE

## 2022-06-30 RX ORDER — BENZONATATE 100 MG/1
CAPSULE ORAL
COMMUNITY
Start: 2022-06-27

## 2022-06-30 RX ORDER — POLYMYXIN B SULFATE AND TRIMETHOPRIM 1; 10000 MG/ML; [USP'U]/ML
SOLUTION OPHTHALMIC
COMMUNITY
Start: 2022-06-28

## 2022-06-30 RX ORDER — PREDNISONE 20 MG/1
TABLET ORAL
COMMUNITY
Start: 2022-06-27 | End: 2022-09-01 | Stop reason: ALTCHOICE

## 2022-06-30 RX ORDER — CELECOXIB 100 MG/1
CAPSULE ORAL
COMMUNITY
Start: 2022-05-17

## 2022-06-30 ASSESSMENT — ENCOUNTER SYMPTOMS
VOMITING: 0
DIARRHEA: 0
COLOR CHANGE: 0
CONSTIPATION: 0
NAUSEA: 0

## 2022-06-30 NOTE — PROGRESS NOTES
[facility]  Return Patient    Chief Complaint:   Chief Complaint   Patient presents with    Foot Pain     left foot, swelling, pcp said mild arthritis       HPI: Franklin Sibley is a 58 y.o. female who presents to the office today with new pain left foot. Started about 2 weeks ago. Stepped awkwardly on the steps. Had xrays 3 days ago. They were negative. . Painful at work. Works on her feet at [employer]. Currently denies F/C/N/V. Allergies   Allergen Reactions    Codeine Other (See Comments)     Stomach pain        Past Medical History:   Diagnosis Date    DDD (degenerative disc disease), thoracolumbar     Osteoarthritis        Prior to Admission medications    Medication Sig Start Date End Date Taking? Authorizing Provider   azithromycin (ZITHROMAX) 250 MG tablet TAKE 2 TABLETS BY MOUTH ON DAY 1, THEN TAKE 1 TABLET BY MOUTH DAILY DAYS 2 THRU 5 6/27/22  Yes Historical Provider, MD   benzonatate (TESSALON) 100 MG capsule Take 1 capsule by mouth 3 times a day as needed for cough.  6/27/22  Yes Historical Provider, MD   celecoxib (CELEBREX) 100 MG capsule TAKE 1 CAPSULE BY MOUTH IN THE MORNING AND BEFORE BEDTIME 5/17/22  Yes Historical Provider, MD   trimethoprim-polymyxin b (POLYTRIM) 16873-7.1 UNIT/ML-% ophthalmic solution  6/28/22  Yes Historical Provider, MD   predniSONE (DELTASONE) 20 MG tablet TAKE 1 TABLET BY MOUTH IN THE MORNING 6/27/22  Yes Historical Provider, MD   hydroCHLOROthiazide (HYDRODIURIL) 25 MG tablet  5/17/22  Yes Historical Provider, MD   cyclobenzaprine (FLEXERIL) 10 MG tablet TAKE 1 TABLET BY MOUTH EVERY EIGHT HOURS AS NEEDED FOR MUSCLE SPASM 10/7/21  Yes Historical Provider, MD   levothyroxine (SYNTHROID) 100 MCG tablet TAKE 1 TABLET BY MOUTH EVERY DAY 1/7/21  Yes Historical Provider, MD   vitamin D3 (CHOLECALCIFEROL) 125 MCG (5000 UT) TABS tablet Take 5,000 Units by mouth daily   Yes Historical Provider, MD   sertraline (ZOLOFT) 50 MG tablet Take 50 mg by mouth daily   Yes Historical Provider, MD   traMADol (ULTRAM) 50 MG tablet Take 50 mg by mouth. 1/7/20  Yes Historical Provider, MD       Past Surgical History:   Procedure Laterality Date    APPENDECTOMY      CARPAL TUNNEL RELEASE      FOOT SURGERY      GALLBLADDER SURGERY         Family History   Problem Relation Age of Onset    Arthritis Mother     Cancer Mother     Arthritis Father     Arthritis Brother     Diabetes Maternal Uncle     Arthritis Maternal Grandmother     Cancer Maternal Grandmother     Diabetes Maternal Grandmother     Cancer Maternal Grandfather        Social History     Tobacco Use    Smoking status: Current Every Day Smoker     Packs/day: 0.50     Years: 40.00     Pack years: 20.00     Types: Cigarettes    Smokeless tobacco: Never Used   Substance Use Topics    Alcohol use: Not on file       Review of Systems   Constitutional: Negative for chills, diaphoresis, fatigue, fever and unexpected weight change. Cardiovascular: Negative for leg swelling. Gastrointestinal: Negative for constipation, diarrhea, nausea and vomiting. Musculoskeletal: Positive for gait problem. Negative for arthralgias and joint swelling. Skin: Negative for color change, pallor, rash and wound. Neurological: Negative for weakness and numbness. Lower Extremity Physical Examination:     Vitals: There were no vitals filed for this visit. General: AAO x 3 in NAD. Vascular: DP and PT pulses palpable 2/4, Bilateral.  CFT <3 seconds, Bilateral.  Hair growth present to the level of the digits, Bilateral.  Edema absent, Bilateral.  Varicosities absent, Bilateral. Erythema absent, Bilateral    Neurological:  Sensation present to light touch to level of digits, Bilateral.    Musculoskeletal: Muscle strength 5/5, Bilateral.  Decreased fat pad sub 5th met bilateral, adductovarus left 5th toe. No Pain dorsal and medial navicular right, and resolved at the T-N joint, insertion of the PT tendon also.      Pain 2nd and 3rd mpj, dorsally and plantarly, pain with rom 2nd and 3rd mpj, pain bases of toes. Edema present, pitting. Integument: Warm, dry, supple, Bilateral.  Hyperkeratosis with hard central core, 3 under right 5th met head, one right sub 1st met head, left 5th toe. Asessment: Patient is a 58 y.o. female with:   1. Closed fracture of phalanx of left second toe, initial encounter    2. Closed fracture of phalanx of left third toe, initial encounter    3. Pain of left foot    4. Edema of left foot          Plan: Patient examined and evaluated. Current condition and treatment options discussed in detail. Verbal and written instructions given to patient. Contact office with any questions/problems/concerns. I reviewed the xrays from [facility]. It appears that there are nondisplaced fractures of the bases of the second and third toes. I have dispensed a pneumatic equalizer walker. Due to the patient's diagnosis and related symptoms this is medically necessary for the treatment. The function of this device is to restrict and limit motion and provide stabilization and compression to the affected area. This device will allow the patient to slowly increase strength and ROM of the extremity. Specific instructions on weight bearing and ROM exercises were discussed and given to the patient. The goals and function of this device was explained in detail to the patient. Upon gait analysis, the device appeared to be fitting well and the patient states that the device is comfortable at this time. The patient was shown how to properly apply, wear, and care for the device. The patient was able to apply properly and ambulate without distress. At that time, the device was  dispensed, it was suitable for the patient's condition and was not substandard. No guarantees were given and the precautions were reviewed.  Written instructions and warranty information was given along with the list of the twenty-one (21) Durable Medical Equipment Supplier Guidelines. All the questions were answered satisfactorily    Tubi       Orders Placed This Encounter   Procedures    GA PNEUMAT WALKING BOOT PRE CST     Left Foot     No orders of the defined types were placed in this encounter.        RTC in 3-4 weeks    6/30/2022

## 2022-06-30 NOTE — LETTER
[address] [address]  Phone: [phone]  Fax: [phone]    Kofi Nayana        June 30, 2022     Patient: Santi Cruz   YOB: 1960   Date of Visit: 6/30/2022       To Whom it May Concern:    Sharonda Jolley was seen in my clinic on 6/30/2022. She may return to work on 7/4/2022. If you have any questions or concerns, please don't hesitate to call.     Sincerely,         Mathieu Vasquez DPM

## 2022-07-21 ENCOUNTER — OFFICE VISIT (OUTPATIENT)
Dept: PODIATRY | Age: 62
End: 2022-07-21
Payer: COMMERCIAL

## 2022-07-21 VITALS — BODY MASS INDEX: 34.04 KG/M2 | WEIGHT: 185 LBS | HEIGHT: 62 IN

## 2022-07-21 DIAGNOSIS — S92.502A CLOSED FRACTURE OF PHALANX OF LEFT THIRD TOE, INITIAL ENCOUNTER: ICD-10-CM

## 2022-07-21 DIAGNOSIS — S92.502A CLOSED FRACTURE OF PHALANX OF LEFT SECOND TOE, INITIAL ENCOUNTER: Primary | ICD-10-CM

## 2022-07-21 DIAGNOSIS — R60.0 EDEMA OF LEFT FOOT: ICD-10-CM

## 2022-07-21 DIAGNOSIS — M79.672 PAIN OF LEFT FOOT: ICD-10-CM

## 2022-07-21 PROCEDURE — 99213 OFFICE O/P EST LOW 20 MIN: CPT | Performed by: PODIATRIST

## 2022-07-21 RX ORDER — PREDNISONE 10 MG/1
1 TABLET ORAL DAILY
Qty: 21 EACH | Refills: 0 | Status: SHIPPED | OUTPATIENT
Start: 2022-07-21 | End: 2022-09-01 | Stop reason: ALTCHOICE

## 2022-07-21 ASSESSMENT — ENCOUNTER SYMPTOMS
CONSTIPATION: 0
DIARRHEA: 0
COLOR CHANGE: 0
VOMITING: 0
NAUSEA: 0

## 2022-07-21 NOTE — PROGRESS NOTES
[facility]  Return Patient    Chief Complaint:   Chief Complaint   Patient presents with    Foot Pain     Fx follow up left foot        HPI: Pamela Carlson is a 58 y.o. female who presents to the office today with for follow up of left foot. Doing better with the CAM walker. Working, still some swelling. Started about 2 weeks ago. Stepped awkwardly on the steps. Had xrays 3 days ago. They were negative. . Painful at work. Works on her feet at [employer]. Currently denies F/C/N/V. Allergies   Allergen Reactions    Codeine Other (See Comments)     Stomach pain        Past Medical History:   Diagnosis Date    DDD (degenerative disc disease), thoracolumbar     Osteoarthritis        Prior to Admission medications    Medication Sig Start Date End Date Taking?  Authorizing Provider   predniSONE 10 MG (21) TBPK Take 1 tablet by mouth daily Take 60mg po on day 1, 50mg po on day 2, 40mg po on day 3, 30mg po on day 4, 20 mg po on day 5, 10mg po on day 6 7/21/22  Yes Anahi Chow, DPM   celecoxib (CELEBREX) 100 MG capsule TAKE 1 CAPSULE BY MOUTH IN THE MORNING AND BEFORE BEDTIME 5/17/22  Yes Historical Provider, MD   trimethoprim-polymyxin b (POLYTRIM) 22230-7.1 UNIT/ML-% ophthalmic solution  6/28/22  Yes Historical Provider, MD   predniSONE (DELTASONE) 20 MG tablet TAKE 1 TABLET BY MOUTH IN THE MORNING 6/27/22  Yes Historical Provider, MD   hydroCHLOROthiazide (HYDRODIURIL) 25 MG tablet  5/17/22  Yes Historical Provider, MD   cyclobenzaprine (FLEXERIL) 10 MG tablet TAKE 1 TABLET BY MOUTH EVERY EIGHT HOURS AS NEEDED FOR MUSCLE SPASM 10/7/21  Yes Historical Provider, MD   levothyroxine (SYNTHROID) 100 MCG tablet TAKE 1 TABLET BY MOUTH EVERY DAY 1/7/21  Yes Historical Provider, MD   vitamin D3 (CHOLECALCIFEROL) 125 MCG (5000 UT) TABS tablet Take 5,000 Units by mouth daily   Yes Historical Provider, MD   sertraline (ZOLOFT) 50 MG tablet Take 50 mg by mouth daily   Yes Historical Provider, MD   traMADol (ULTRAM) 50 MG tablet Take 50 mg by mouth. 1/7/20  Yes Historical Provider, MD   benzonatate (TESSALON) 100 MG capsule Take 1 capsule by mouth 3 times a day as needed for cough. Patient not taking: Reported on 7/21/2022 6/27/22   Historical Provider, MD       Past Surgical History:   Procedure Laterality Date    APPENDECTOMY      CARPAL TUNNEL RELEASE      FOOT SURGERY      GALLBLADDER SURGERY         Family History   Problem Relation Age of Onset    Arthritis Mother     Cancer Mother     Arthritis Father     Arthritis Brother     Diabetes Maternal Uncle     Arthritis Maternal Grandmother     Cancer Maternal Grandmother     Diabetes Maternal Grandmother     Cancer Maternal Grandfather        Social History     Tobacco Use    Smoking status: Every Day     Packs/day: 0.50     Years: 40.00     Pack years: 20.00     Types: Cigarettes    Smokeless tobacco: Never   Substance Use Topics    Alcohol use: Not on file       Review of Systems   Constitutional:  Negative for chills, diaphoresis, fatigue, fever and unexpected weight change. Cardiovascular:  Negative for leg swelling. Gastrointestinal:  Negative for constipation, diarrhea, nausea and vomiting. Musculoskeletal:  Positive for gait problem. Negative for arthralgias and joint swelling. Skin:  Negative for color change, pallor, rash and wound. Neurological:  Negative for weakness and numbness. Lower Extremity Physical Examination:     Vitals: There were no vitals filed for this visit. General: AAO x 3 in NAD. Vascular: DP and PT pulses palpable 2/4, Bilateral.  CFT <3 seconds, Bilateral.  Hair growth present to the level of the digits, Bilateral.  Edema absent, Bilateral.  Varicosities absent, Bilateral. Erythema absent, Bilateral    Neurological:  Sensation present to light touch to level of digits, Bilateral.    Musculoskeletal: Muscle strength 5/5, Bilateral.  Decreased fat pad sub 5th met bilateral, adductovarus left 5th toe.  No Pain dorsal and medial navicular right, and resolved at the T-N joint, insertion of the PT tendon also. Pain 2nd and 3rd mpj, dorsally and plantarly, pain with rom 2nd and 3rd mpj, pain bases of toes. Edema present, pitting. Integument: Warm, dry, supple, Bilateral.  Hyperkeratosis with hard central core, 3 under right 5th met head, one right sub 1st met head, left 5th toe. Radiographs: Three weightbearing views (AP, Oblique, and Lateral) of the left foot were obtained in the office today and reviewed, revealing no acute fracture, dislocation, or radioopaque foreign body/tumor. Overall alignment is satisfactory. Electronically signed by Nikos Galarza DPM      Asessment: Patient is a 58 y.o. female with:   1. Closed fracture of phalanx of left second toe, initial encounter    2. Closed fracture of phalanx of left third toe, initial encounter    3. Pain of left foot    4. Edema of left foot          Plan: Patient examined and evaluated. Current condition and treatment options discussed in detail. Verbal and written instructions given to patient. Contact office with any questions/problems/concerns.      Slow transition out of CAM walker and into shoes  Prednisone taper    Tubi  if needed      Orders Placed This Encounter   Procedures    XR FOOT LEFT (MIN 3 VIEWS)     Orders Placed This Encounter   Medications    predniSONE 10 MG (21) TBPK     Sig: Take 1 tablet by mouth daily Take 60mg po on day 1, 50mg po on day 2, 40mg po on day 3, 30mg po on day 4, 20 mg po on day 5, 10mg po on day 6     Dispense:  21 each     Refill:  0        RTC in 3-4 weeks  for general foot care  7/21/2022

## 2022-08-08 ENCOUNTER — TELEPHONE (OUTPATIENT)
Dept: PODIATRY | Age: 62
End: 2022-08-08

## 2022-08-08 DIAGNOSIS — R60.0 EDEMA OF LEFT FOOT: ICD-10-CM

## 2022-08-08 DIAGNOSIS — M79.672 PAIN OF LEFT FOOT: ICD-10-CM

## 2022-08-08 DIAGNOSIS — S92.502A CLOSED FRACTURE OF PHALANX OF LEFT FIFTH TOE, INITIAL ENCOUNTER: Primary | ICD-10-CM

## 2022-08-08 NOTE — TELEPHONE ENCOUNTER
Patient had weaned herself out of her boot however her foot had started to swell. She has been keeping it elevated and applying ice but her foot is too swollen to even wear the boot. Please advise.

## 2022-09-01 ENCOUNTER — OFFICE VISIT (OUTPATIENT)
Dept: PODIATRY | Age: 62
End: 2022-09-01
Payer: COMMERCIAL

## 2022-09-01 VITALS — HEIGHT: 62 IN | WEIGHT: 185 LBS | BODY MASS INDEX: 34.04 KG/M2

## 2022-09-01 DIAGNOSIS — M79.671 PAIN IN BOTH FEET: ICD-10-CM

## 2022-09-01 DIAGNOSIS — D23.71 BENIGN NEOPLASM OF SKIN OF FOOT, RIGHT: ICD-10-CM

## 2022-09-01 DIAGNOSIS — R60.0 EDEMA OF LEFT FOOT: Primary | ICD-10-CM

## 2022-09-01 DIAGNOSIS — M79.672 PAIN OF LEFT FOOT: ICD-10-CM

## 2022-09-01 DIAGNOSIS — L85.1 ACQUIRED PLANTAR KERATODERMA: ICD-10-CM

## 2022-09-01 DIAGNOSIS — M84.375A FRACTURE, STRESS, METATARSAL, LEFT, INITIAL ENCOUNTER: ICD-10-CM

## 2022-09-01 DIAGNOSIS — D23.72 BENIGN NEOPLASM OF SKIN OF FOOT, LEFT: ICD-10-CM

## 2022-09-01 DIAGNOSIS — M79.672 PAIN IN BOTH FEET: ICD-10-CM

## 2022-09-01 PROCEDURE — 99213 OFFICE O/P EST LOW 20 MIN: CPT | Performed by: PODIATRIST

## 2022-09-01 ASSESSMENT — ENCOUNTER SYMPTOMS
DIARRHEA: 0
VOMITING: 0
COLOR CHANGE: 0
CONSTIPATION: 0
NAUSEA: 0

## 2022-09-01 NOTE — PROGRESS NOTES
[facility]  Return Patient    Chief Complaint:   Chief Complaint   Patient presents with    Foot Pain     Follow up left foot        HPI: Angie Moralez is a 58 y.o. female who presents to the office today with for follow up of left foot. Went back to work and pain quickly returned. I ordered an MRI and I have reviewed. It showed fractures of multiple metatarsals. She is doing well, pain level 1. Working, no swelling. Astrid Stands History: Started about 2 weeks ago. Stepped awkwardly on the steps. Had xrays 3 days ago. They were negative. . Painful at work. Works on her feet at [employer] Currently denies F/C/N/V. She also has painful hyperkeratosis bilateral.     Allergies   Allergen Reactions    Codeine Other (See Comments)     Stomach pain        Past Medical History:   Diagnosis Date    DDD (degenerative disc disease), thoracolumbar     Osteoarthritis        Prior to Admission medications    Medication Sig Start Date End Date Taking? Authorizing Provider   B Complex Vitamins (VITAMIN B-COMPLEX PO) Take by mouth   Yes Historical Provider, MD   benzonatate (TESSALON) 100 MG capsule  6/27/22  Yes Historical Provider, MD   celecoxib (CELEBREX) 100 MG capsule TAKE 1 CAPSULE BY MOUTH IN THE MORNING AND BEFORE BEDTIME 5/17/22  Yes Historical Provider, MD   trimethoprim-polymyxin b (POLYTRIM) 32539-3.1 UNIT/ML-% ophthalmic solution  6/28/22  Yes Historical Provider, MD   levothyroxine (SYNTHROID) 100 MCG tablet TAKE 1 TABLET BY MOUTH EVERY DAY 1/7/21  Yes Historical Provider, MD   vitamin D3 (CHOLECALCIFEROL) 125 MCG (5000 UT) TABS tablet Take 5,000 Units by mouth daily   Yes Historical Provider, MD   sertraline (ZOLOFT) 50 MG tablet Take 50 mg by mouth daily   Yes Historical Provider, MD   traMADol (ULTRAM) 50 MG tablet Take 50 mg by mouth.  1/7/20  Yes Historical Provider, MD   hydroCHLOROthiazide (HYDRODIURIL) 25 MG tablet  5/17/22   Historical Provider, MD   cyclobenzaprine (FLEXERIL) 10 MG tablet TAKE 1 TABLET BY MOUTH EVERY EIGHT HOURS AS NEEDED FOR MUSCLE SPASM 10/7/21   Historical Provider, MD       Past Surgical History:   Procedure Laterality Date    APPENDECTOMY      CARPAL TUNNEL RELEASE      FOOT SURGERY      GALLBLADDER SURGERY         Family History   Problem Relation Age of Onset    Arthritis Mother     Cancer Mother     Arthritis Father     Arthritis Brother     Diabetes Maternal Uncle     Arthritis Maternal Grandmother     Cancer Maternal Grandmother     Diabetes Maternal Grandmother     Cancer Maternal Grandfather        Social History     Tobacco Use    Smoking status: Every Day     Packs/day: 0.50     Years: 40.00     Pack years: 20.00     Types: Cigarettes    Smokeless tobacco: Never   Substance Use Topics    Alcohol use: Not on file       Review of Systems   Constitutional:  Negative for chills, diaphoresis, fatigue, fever and unexpected weight change. Cardiovascular:  Negative for leg swelling. Gastrointestinal:  Negative for constipation, diarrhea, nausea and vomiting. Musculoskeletal:  Positive for gait problem. Negative for arthralgias and joint swelling. Skin:  Negative for color change, pallor, rash and wound. Neurological:  Negative for weakness and numbness. Lower Extremity Physical Examination:     Vitals: There were no vitals filed for this visit. General: AAO x 3 in NAD. Vascular: DP and PT pulses palpable 2/4, Bilateral.  CFT <3 seconds, Bilateral.  Hair growth present to the level of the digits, Bilateral.  Edema absent, Bilateral.  Varicosities absent, Bilateral. Erythema absent, Bilateral    Neurological:  Sensation present to light touch to level of digits, Bilateral.    Musculoskeletal: Muscle strength 5/5, Bilateral.  Decreased fat pad sub 5th met bilateral, adductovarus left 5th toe. No Pain dorsal and medial navicular right, and resolved at the T-N joint, insertion of the PT tendon also.      Pain 2nd and 3rd mpj, dorsally and plantarly, pain with rom 2nd and 3rd mpj, pain bases of toes. Edema present, pitting. Integument: Warm, dry, supple, Bilateral.  Hyperkeratosis with hard central core, 3 under right 5th met head, one right sub 1st met head, left 5th toe. Radiographs: Three weightbearing views (AP, Oblique, and Lateral) of the left foot were obtained in the office today and reviewed, revealing no acute fracture, dislocation, or radioopaque foreign body/tumor. Overall alignment is satisfactory. Electronically signed by Damien Khoury DPM      Asessment: Patient is a 58 y.o. female with:   1. Edema of left foot    2. Pain of left foot    3. Fracture, stress, metatarsal, left, initial encounter    4. Benign neoplasm of skin of foot, right    5. Benign neoplasm of skin of foot, left    6. Pain in both feet    7. Acquired plantar keratoderma          Plan: Patient examined and evaluated. Current condition and treatment options discussed in detail. Verbal and written instructions given to patient. Contact office with any questions/problems/concerns. Good shoes  Change frequently  Continue Vit D  Tubi  if needed  Discussed orthotics    The lesion was partially debrided to healthy pink skin. No bleeding was noted. No ulceration noted. The patient tolerated the procedure well and without complication. Apperature pad applied. Discussed with patient options for offloading pressure on the area. No orders of the defined types were placed in this encounter. No orders of the defined types were placed in this encounter.        RTC in 3-4 weeks  for general foot care  9/1/2022

## 2022-11-03 ENCOUNTER — OFFICE VISIT (OUTPATIENT)
Dept: PODIATRY | Age: 62
End: 2022-11-03
Payer: COMMERCIAL

## 2022-11-03 VITALS — WEIGHT: 182 LBS | HEIGHT: 62 IN | BODY MASS INDEX: 33.49 KG/M2

## 2022-11-03 DIAGNOSIS — L85.1 ACQUIRED PLANTAR KERATODERMA: ICD-10-CM

## 2022-11-03 DIAGNOSIS — M79.672 PAIN IN BOTH FEET: ICD-10-CM

## 2022-11-03 DIAGNOSIS — M79.671 PAIN IN BOTH FEET: ICD-10-CM

## 2022-11-03 DIAGNOSIS — D23.71 BENIGN NEOPLASM OF SKIN OF FOOT, RIGHT: ICD-10-CM

## 2022-11-03 DIAGNOSIS — D23.72 BENIGN NEOPLASM OF SKIN OF FOOT, LEFT: Primary | ICD-10-CM

## 2022-11-03 PROCEDURE — 17110 DESTRUCTION B9 LES UP TO 14: CPT | Performed by: PODIATRIST

## 2022-11-03 PROCEDURE — 99999 PR OFFICE/OUTPT VISIT,PROCEDURE ONLY: CPT | Performed by: PODIATRIST

## 2022-11-03 ASSESSMENT — ENCOUNTER SYMPTOMS
CONSTIPATION: 0
NAUSEA: 0
COLOR CHANGE: 0
VOMITING: 0
DIARRHEA: 0

## 2022-11-03 NOTE — PROGRESS NOTES
[facility]  Return Patient    Chief Complaint:   Chief Complaint   Patient presents with    Nail Problem     B/l nail trim, last seen Davidson Clarke APRN CNP 9/1/22    Callouses     B/l callous trim       HPI: Lizbeth Smith is a 58 y.o. female who presents to the office today for follow up of painful callouses, pain right foot and left, growing rapidly. Periodic debridements help. Painful at work. Works on her feet at [employer]. Currently denies F/C/N/V. Allergies   Allergen Reactions    Codeine Other (See Comments)     Stomach pain        Past Medical History:   Diagnosis Date    DDD (degenerative disc disease), thoracolumbar     Osteoarthritis        Prior to Admission medications    Medication Sig Start Date End Date Taking? Authorizing Provider   B Complex Vitamins (VITAMIN B-COMPLEX PO) Take by mouth   Yes Historical Provider, MD   benzonatate (TESSALON) 100 MG capsule  6/27/22  Yes Historical Provider, MD   celecoxib (CELEBREX) 100 MG capsule TAKE 1 CAPSULE BY MOUTH IN THE MORNING AND BEFORE BEDTIME 5/17/22  Yes Historical Provider, MD   trimethoprim-polymyxin b (POLYTRIM) 90248-7.1 UNIT/ML-% ophthalmic solution  6/28/22  Yes Historical Provider, MD   hydroCHLOROthiazide (HYDRODIURIL) 25 MG tablet  5/17/22  Yes Historical Provider, MD   cyclobenzaprine (FLEXERIL) 10 MG tablet TAKE 1 TABLET BY MOUTH EVERY EIGHT HOURS AS NEEDED FOR MUSCLE SPASM 10/7/21  Yes Historical Provider, MD   levothyroxine (SYNTHROID) 100 MCG tablet TAKE 1 TABLET BY MOUTH EVERY DAY 1/7/21  Yes Historical Provider, MD   vitamin D3 (CHOLECALCIFEROL) 125 MCG (5000 UT) TABS tablet Take 5,000 Units by mouth daily   Yes Historical Provider, MD   sertraline (ZOLOFT) 50 MG tablet Take 50 mg by mouth daily   Yes Historical Provider, MD   traMADol (ULTRAM) 50 MG tablet Take 50 mg by mouth.  1/7/20  Yes Historical Provider, MD       Past Surgical History:   Procedure Laterality Date    APPENDECTOMY      CARPAL TUNNEL RELEASE FOOT SURGERY      GALLBLADDER SURGERY         Family History   Problem Relation Age of Onset    Arthritis Mother     Cancer Mother     Arthritis Father     Arthritis Brother     Diabetes Maternal Uncle     Arthritis Maternal Grandmother     Cancer Maternal Grandmother     Diabetes Maternal Grandmother     Cancer Maternal Grandfather        Social History     Tobacco Use    Smoking status: Every Day     Packs/day: 0.50     Years: 40.00     Pack years: 20.00     Types: Cigarettes    Smokeless tobacco: Never   Substance Use Topics    Alcohol use: Not on file       Review of Systems   Constitutional:  Negative for chills, diaphoresis, fatigue, fever and unexpected weight change. Cardiovascular:  Negative for leg swelling. Gastrointestinal:  Negative for constipation, diarrhea, nausea and vomiting. Musculoskeletal:  Positive for gait problem. Negative for arthralgias and joint swelling. Skin:  Negative for color change, pallor, rash and wound. Neurological:  Negative for weakness and numbness. Lower Extremity Physical Examination:     Vitals: There were no vitals filed for this visit. General: AAO x 3 in NAD. Vascular: DP and PT pulses palpable 2/4, Bilateral.  CFT <3 seconds, Bilateral.  Hair growth present to the level of the digits, Bilateral.  Edema absent, Bilateral.  Varicosities absent, Bilateral. Erythema absent, Bilateral    Neurological:  Sensation present to light touch to level of digits, Bilateral.    Musculoskeletal: Muscle strength 5/5, Bilateral.  Decreased fat pad sub 5th met bilateral, adductovarus left 5th toe. No Pain dorsal and medial navicular right, and resolved at the T-N joint, insertion of the PT tendon also. Integument: Warm, dry, supple, Bilateral.  Hyperkeratosis with hard central core, 3 under right 5th met head, one right sub 1st met head, left 5th toe. Radiographs: 3 views right Foot:  Severe degenerative changes at the talo-navicular joint.  Fracture of the dorsal portion of the navicular, non-displaced and age indeterminate Generalized osteopenia. Asessment: Patient is a 58 y.o. female with:   1. Benign neoplasm of skin of foot, left    2. Benign neoplasm of skin of foot, right    3. Pain in both feet    4. Acquired plantar keratoderma          Plan: Patient examined and evaluated. Current condition and treatment options discussed in detail. Verbal and written instructions given to patient. Contact office with any questions/problems/concerns. The lesion(s) was partially debrided, enucleated, and silver nitrate was applied with an apperature pad under occlusion. The patient will leave in place for 24-48 hours and than remove. The patient tolerated the procedure well and without complication. Wear good shoes    Orders Placed This Encounter   Procedures    40239 - [place] DESTRUCTION BENIGN LESIONS UP TO 14     No orders of the defined types were placed in this encounter.        RTC in 9 weeks    11/3/2022

## 2023-01-05 ENCOUNTER — OFFICE VISIT (OUTPATIENT)
Dept: PODIATRY | Age: 63
End: 2023-01-05
Payer: COMMERCIAL

## 2023-01-05 VITALS — BODY MASS INDEX: 33.49 KG/M2 | HEIGHT: 62 IN | WEIGHT: 182 LBS

## 2023-01-05 DIAGNOSIS — L85.1 ACQUIRED PLANTAR KERATODERMA: ICD-10-CM

## 2023-01-05 DIAGNOSIS — D23.71 BENIGN NEOPLASM OF SKIN OF FOOT, RIGHT: ICD-10-CM

## 2023-01-05 DIAGNOSIS — M79.672 PAIN IN BOTH FEET: ICD-10-CM

## 2023-01-05 DIAGNOSIS — M79.671 PAIN IN BOTH FEET: ICD-10-CM

## 2023-01-05 DIAGNOSIS — D23.72 BENIGN NEOPLASM OF SKIN OF FOOT, LEFT: Primary | ICD-10-CM

## 2023-01-05 PROCEDURE — 99999 PR OFFICE/OUTPT VISIT,PROCEDURE ONLY: CPT | Performed by: PODIATRIST

## 2023-01-05 PROCEDURE — 17110 DESTRUCTION B9 LES UP TO 14: CPT | Performed by: PODIATRIST

## 2023-01-05 ASSESSMENT — ENCOUNTER SYMPTOMS
VOMITING: 0
CONSTIPATION: 0
COLOR CHANGE: 0
DIARRHEA: 0
NAUSEA: 0

## 2023-01-05 NOTE — PROGRESS NOTES
[facility]  Return Patient    Chief Complaint:   Chief Complaint   Patient presents with    Nail Problem     B/l nail trim,last seen Bubba Galvez APRN CNP 9/1/22    Callouses     B/l callous trim       HPI: Scott Carr is a 58 y.o. female who presents to the office today for follow up of painful callouses, pain right foot and left, growing rapidly. Periodic debridements help. Painful at work. Works on her feet at [employer]. Currently denies F/C/N/V. Allergies   Allergen Reactions    Codeine Other (See Comments)     Stomach pain        Past Medical History:   Diagnosis Date    DDD (degenerative disc disease), thoracolumbar     Osteoarthritis        Prior to Admission medications    Medication Sig Start Date End Date Taking? Authorizing Provider   B Complex Vitamins (VITAMIN B-COMPLEX PO) Take by mouth   Yes Historical Provider, MD   benzonatate (TESSALON) 100 MG capsule  6/27/22  Yes Historical Provider, MD   celecoxib (CELEBREX) 100 MG capsule TAKE 1 CAPSULE BY MOUTH IN THE MORNING AND BEFORE BEDTIME 5/17/22  Yes Historical Provider, MD   trimethoprim-polymyxin b (POLYTRIM) 72994-8.1 UNIT/ML-% ophthalmic solution  6/28/22  Yes Historical Provider, MD   hydroCHLOROthiazide (HYDRODIURIL) 25 MG tablet  5/17/22  Yes Historical Provider, MD   cyclobenzaprine (FLEXERIL) 10 MG tablet TAKE 1 TABLET BY MOUTH EVERY EIGHT HOURS AS NEEDED FOR MUSCLE SPASM 10/7/21  Yes Historical Provider, MD   levothyroxine (SYNTHROID) 100 MCG tablet TAKE 1 TABLET BY MOUTH EVERY DAY 1/7/21  Yes Historical Provider, MD   vitamin D3 (CHOLECALCIFEROL) 125 MCG (5000 UT) TABS tablet Take 5,000 Units by mouth daily   Yes Historical Provider, MD   sertraline (ZOLOFT) 50 MG tablet Take 50 mg by mouth daily   Yes Historical Provider, MD   traMADol (ULTRAM) 50 MG tablet Take 50 mg by mouth.  1/7/20  Yes Historical Provider, MD       Past Surgical History:   Procedure Laterality Date    APPENDECTOMY      CARPAL TUNNEL RELEASE FOOT SURGERY      GALLBLADDER SURGERY         Family History   Problem Relation Age of Onset    Arthritis Mother     Cancer Mother     Arthritis Father     Arthritis Brother     Diabetes Maternal Uncle     Arthritis Maternal Grandmother     Cancer Maternal Grandmother     Diabetes Maternal Grandmother     Cancer Maternal Grandfather        Social History     Tobacco Use    Smoking status: Every Day     Packs/day: 0.50     Years: 40.00     Pack years: 20.00     Types: Cigarettes    Smokeless tobacco: Never   Substance Use Topics    Alcohol use: Not on file       Review of Systems   Constitutional:  Negative for chills, diaphoresis, fatigue, fever and unexpected weight change. Cardiovascular:  Negative for leg swelling. Gastrointestinal:  Negative for constipation, diarrhea, nausea and vomiting. Musculoskeletal:  Positive for gait problem. Negative for arthralgias and joint swelling. Skin:  Negative for color change, pallor, rash and wound. Neurological:  Negative for weakness and numbness. Lower Extremity Physical Examination:     Vitals: There were no vitals filed for this visit. General: AAO x 3 in NAD. Vascular: DP and PT pulses palpable 2/4, Bilateral.  CFT <3 seconds, Bilateral.  Hair growth present to the level of the digits, Bilateral.  Edema absent, Bilateral.  Varicosities absent, Bilateral. Erythema absent, Bilateral    Neurological:  Sensation present to light touch to level of digits, Bilateral.    Musculoskeletal: Muscle strength 5/5, Bilateral.  Decreased fat pad sub 5th met bilateral, adductovarus left 5th toe. No Pain dorsal and medial navicular right, and resolved at the T-N joint, insertion of the PT tendon also. Integument: Warm, dry, supple, Bilateral.  Hyperkeratosis with hard central core, 3 under right 5th met head, one right sub 1st met head, left 5th toe. Radiographs: 3 views right Foot:  Severe degenerative changes at the talo-navicular joint.  Fracture of the dorsal portion of the navicular, non-displaced and age indeterminate Generalized osteopenia. Asessment: Patient is a 58 y.o. female with:   1. Benign neoplasm of skin of foot, left    2. Benign neoplasm of skin of foot, right    3. Pain in both feet    4. Acquired plantar keratoderma          Plan: Patient examined and evaluated. Current condition and treatment options discussed in detail. Verbal and written instructions given to patient. Contact office with any questions/problems/concerns. The lesion(s) was partially debrided, enucleated, and silver nitrate was applied with an apperature pad under occlusion. The patient will leave in place for 24-48 hours and than remove. The patient tolerated the procedure well and without complication. Wear good shoes    Orders Placed This Encounter   Procedures    18842 - [place] DESTRUCTION BENIGN LESIONS UP TO 14     No orders of the defined types were placed in this encounter.        RTC in 9 weeks    1/5/2023

## 2023-03-16 ENCOUNTER — OFFICE VISIT (OUTPATIENT)
Dept: PODIATRY | Age: 63
End: 2023-03-16

## 2023-03-16 VITALS — WEIGHT: 182 LBS | BODY MASS INDEX: 33.49 KG/M2 | HEIGHT: 62 IN

## 2023-03-16 DIAGNOSIS — M79.671 PAIN IN BOTH FEET: ICD-10-CM

## 2023-03-16 DIAGNOSIS — D23.72 BENIGN NEOPLASM OF SKIN OF FOOT, LEFT: Primary | ICD-10-CM

## 2023-03-16 DIAGNOSIS — D23.71 BENIGN NEOPLASM OF SKIN OF FOOT, RIGHT: ICD-10-CM

## 2023-03-16 DIAGNOSIS — M79.672 PAIN IN BOTH FEET: ICD-10-CM

## 2023-03-16 DIAGNOSIS — L85.1 ACQUIRED PLANTAR KERATODERMA: ICD-10-CM

## 2023-03-16 ASSESSMENT — ENCOUNTER SYMPTOMS
COLOR CHANGE: 0
CONSTIPATION: 0
NAUSEA: 0
DIARRHEA: 0
VOMITING: 0

## 2023-03-16 NOTE — PROGRESS NOTES
[facility]  Return Patient    Chief Complaint:   Chief Complaint   Patient presents with    Nail Problem     B/l nail trim/ last seen Katiana Lobo 1/6/2023    Callouses     Right foot        HPI: Jacob Fox is a 58 y.o. female who presents to the office today for follow up of painful callouses, pain right foot and left, growing rapidly. Periodic debridements help. Painful at work. Works on her feet at [employer]. Currently denies F/C/N/V. Allergies   Allergen Reactions    Codeine Other (See Comments)     Stomach pain        Past Medical History:   Diagnosis Date    DDD (degenerative disc disease), thoracolumbar     Osteoarthritis        Prior to Admission medications    Medication Sig Start Date End Date Taking? Authorizing Provider   B Complex Vitamins (VITAMIN B-COMPLEX PO) Take by mouth   Yes Historical Provider, MD   benzonatate (TESSALON) 100 MG capsule  6/27/22  Yes Historical Provider, MD   celecoxib (CELEBREX) 100 MG capsule TAKE 1 CAPSULE BY MOUTH IN THE MORNING AND BEFORE BEDTIME 5/17/22  Yes Historical Provider, MD   trimethoprim-polymyxin b (POLYTRIM) 47997-3.1 UNIT/ML-% ophthalmic solution  6/28/22  Yes Historical Provider, MD   hydroCHLOROthiazide (HYDRODIURIL) 25 MG tablet  5/17/22  Yes Historical Provider, MD   cyclobenzaprine (FLEXERIL) 10 MG tablet TAKE 1 TABLET BY MOUTH EVERY EIGHT HOURS AS NEEDED FOR MUSCLE SPASM 10/7/21  Yes Historical Provider, MD   levothyroxine (SYNTHROID) 100 MCG tablet TAKE 1 TABLET BY MOUTH EVERY DAY 1/7/21  Yes Historical Provider, MD   vitamin D3 (CHOLECALCIFEROL) 125 MCG (5000 UT) TABS tablet Take 5,000 Units by mouth daily   Yes Historical Provider, MD   sertraline (ZOLOFT) 50 MG tablet Take 50 mg by mouth daily   Yes Historical Provider, MD   traMADol (ULTRAM) 50 MG tablet Take 50 mg by mouth.  1/7/20  Yes Historical Provider, MD       Past Surgical History:   Procedure Laterality Date    APPENDECTOMY      CARPAL TUNNEL RELEASE      FOOT SURGERY GALLBLADDER SURGERY         Family History   Problem Relation Age of Onset    Arthritis Mother     Cancer Mother     Arthritis Father     Arthritis Brother     Diabetes Maternal Uncle     Arthritis Maternal Grandmother     Cancer Maternal Grandmother     Diabetes Maternal Grandmother     Cancer Maternal Grandfather        Social History     Tobacco Use    Smoking status: Every Day     Packs/day: 0.50     Years: 40.00     Pack years: 20.00     Types: Cigarettes    Smokeless tobacco: Never   Substance Use Topics    Alcohol use: Not on file       Review of Systems   Constitutional:  Negative for chills, diaphoresis, fatigue, fever and unexpected weight change. Cardiovascular:  Negative for leg swelling. Gastrointestinal:  Negative for constipation, diarrhea, nausea and vomiting. Musculoskeletal:  Positive for gait problem. Negative for arthralgias and joint swelling. Skin:  Negative for color change, pallor, rash and wound. Neurological:  Negative for weakness and numbness. Lower Extremity Physical Examination:     Vitals: There were no vitals filed for this visit. General: AAO x 3 in NAD. Vascular: DP and PT pulses palpable 2/4, Bilateral.  CFT <3 seconds, Bilateral.  Hair growth present to the level of the digits, Bilateral.  Edema absent, Bilateral.  Varicosities absent, Bilateral. Erythema absent, Bilateral    Neurological:  Sensation present to light touch to level of digits, Bilateral.    Musculoskeletal: Muscle strength 5/5, Bilateral.  Decreased fat pad sub 5th met bilateral, adductovarus left 5th toe. No Pain dorsal and medial navicular right, and resolved at the T-N joint, insertion of the PT tendon also. Integument: Warm, dry, supple, Bilateral.  Hyperkeratosis with hard central core, 3 under right 5th met head, one right sub 1st met head, left 5th toe. Radiographs: 3 views right Foot:  Severe degenerative changes at the talo-navicular joint.  Fracture of the dorsal portion of the navicular, non-displaced and age indeterminate Generalized osteopenia. Asessment: Patient is a 58 y.o. female with:   1. Benign neoplasm of skin of foot, left    2. Benign neoplasm of skin of foot, right    3. Pain in both feet    4. Acquired plantar keratoderma          Plan: Patient examined and evaluated. Current condition and treatment options discussed in detail. Verbal and written instructions given to patient. Contact office with any questions/problems/concerns. The lesion(s) was partially debrided, enucleated, and silver nitrate was applied with an apperature pad under occlusion. The patient will leave in place for 24-48 hours and than remove. The patient tolerated the procedure well and without complication. Wear good shoes    Orders Placed This Encounter   Procedures    03182 - [place] DESTRUCTION BENIGN LESIONS UP TO 14     No orders of the defined types were placed in this encounter.        RTC in 9 weeks    3/16/2023

## 2023-05-25 ENCOUNTER — OFFICE VISIT (OUTPATIENT)
Dept: PODIATRY | Age: 63
End: 2023-05-25
Payer: COMMERCIAL

## 2023-05-25 VITALS — BODY MASS INDEX: 30.91 KG/M2 | HEIGHT: 62 IN | WEIGHT: 168 LBS

## 2023-05-25 DIAGNOSIS — M79.672 PAIN IN BOTH FEET: ICD-10-CM

## 2023-05-25 DIAGNOSIS — L85.1 ACQUIRED PLANTAR KERATODERMA: ICD-10-CM

## 2023-05-25 DIAGNOSIS — D23.72 BENIGN NEOPLASM OF SKIN OF FOOT, LEFT: Primary | ICD-10-CM

## 2023-05-25 DIAGNOSIS — M79.671 PAIN IN BOTH FEET: ICD-10-CM

## 2023-05-25 DIAGNOSIS — D23.71 BENIGN NEOPLASM OF SKIN OF FOOT, RIGHT: ICD-10-CM

## 2023-05-25 PROCEDURE — 17110 DESTRUCTION B9 LES UP TO 14: CPT | Performed by: PODIATRIST

## 2023-05-25 PROCEDURE — 99999 PR OFFICE/OUTPT VISIT,PROCEDURE ONLY: CPT | Performed by: PODIATRIST

## 2023-05-25 ASSESSMENT — ENCOUNTER SYMPTOMS
DIARRHEA: 0
COLOR CHANGE: 0
NAUSEA: 0
VOMITING: 0
CONSTIPATION: 0

## 2023-05-25 NOTE — PROGRESS NOTES
[facility]  Return Patient    Chief Complaint:   Chief Complaint   Patient presents with    Nail Problem     Bilateral Nail Trim   Pcp Penny Sprague-4/14/2023    Callouses     Bilateral Callouse       HPI: Viktoria Godoy is a 58 y.o. female who presents to the office today for follow up of painful callouses, pain right foot and left, growing rapidly. Periodic debridements help. Painful at work. Works on her feet at [employer]. Currently denies F/C/N/V. Allergies   Allergen Reactions    Codeine Other (See Comments)     Stomach pain        Past Medical History:   Diagnosis Date    DDD (degenerative disc disease), thoracolumbar     Osteoarthritis        Prior to Admission medications    Medication Sig Start Date End Date Taking? Authorizing Provider   B Complex Vitamins (VITAMIN B-COMPLEX PO) Take by mouth   Yes Historical Provider, MD   benzonatate (TESSALON) 100 MG capsule  6/27/22  Yes Historical Provider, MD   celecoxib (CELEBREX) 100 MG capsule TAKE 1 CAPSULE BY MOUTH IN THE MORNING AND BEFORE BEDTIME 5/17/22  Yes Historical Provider, MD   trimethoprim-polymyxin b (POLYTRIM) 07547-2.1 UNIT/ML-% ophthalmic solution  6/28/22  Yes Historical Provider, MD   hydroCHLOROthiazide (HYDRODIURIL) 25 MG tablet  5/17/22  Yes Historical Provider, MD   cyclobenzaprine (FLEXERIL) 10 MG tablet TAKE 1 TABLET BY MOUTH EVERY EIGHT HOURS AS NEEDED FOR MUSCLE SPASM 10/7/21  Yes Historical Provider, MD   levothyroxine (SYNTHROID) 100 MCG tablet TAKE 1 TABLET BY MOUTH EVERY DAY 1/7/21  Yes Historical Provider, MD   vitamin D3 (CHOLECALCIFEROL) 125 MCG (5000 UT) TABS tablet Take 1 tablet by mouth daily   Yes Historical Provider, MD   sertraline (ZOLOFT) 50 MG tablet Take 1 tablet by mouth daily   Yes Historical Provider, MD   traMADol (ULTRAM) 50 MG tablet Take 1 tablet by mouth.  1/7/20  Yes Historical Provider, MD       Past Surgical History:   Procedure Laterality Date    APPENDECTOMY      CARPAL TUNNEL RELEASE

## 2023-07-27 ENCOUNTER — OFFICE VISIT (OUTPATIENT)
Dept: PODIATRY | Age: 63
End: 2023-07-27
Payer: COMMERCIAL

## 2023-07-27 VITALS — WEIGHT: 168 LBS | BODY MASS INDEX: 30.91 KG/M2 | HEIGHT: 62 IN

## 2023-07-27 DIAGNOSIS — D23.72 BENIGN NEOPLASM OF SKIN OF FOOT, LEFT: Primary | ICD-10-CM

## 2023-07-27 DIAGNOSIS — D23.71 BENIGN NEOPLASM OF SKIN OF FOOT, RIGHT: ICD-10-CM

## 2023-07-27 DIAGNOSIS — M79.671 PAIN IN BOTH FEET: ICD-10-CM

## 2023-07-27 DIAGNOSIS — L85.1 ACQUIRED PLANTAR KERATODERMA: ICD-10-CM

## 2023-07-27 DIAGNOSIS — M79.672 PAIN IN BOTH FEET: ICD-10-CM

## 2023-07-27 PROCEDURE — 17110 DESTRUCTION B9 LES UP TO 14: CPT | Performed by: PODIATRIST

## 2023-07-27 PROCEDURE — 99999 PR OFFICE/OUTPT VISIT,PROCEDURE ONLY: CPT | Performed by: PODIATRIST

## 2023-07-27 RX ORDER — CYCLOBENZAPRINE HCL 5 MG
TABLET ORAL
COMMUNITY
Start: 2023-07-18

## 2023-07-27 ASSESSMENT — ENCOUNTER SYMPTOMS
CONSTIPATION: 0
VOMITING: 0
COLOR CHANGE: 0
DIARRHEA: 0
NAUSEA: 0

## 2023-07-27 NOTE — PROGRESS NOTES
[facility]  Return Patient    Chief Complaint:   Chief Complaint   Patient presents with    Nail Problem     B/l nail trim,last seen Cecilia Ortiz 7/18/23    Callouses     B/l       HPI: Speedy Joaquin is a 61 y.o. female who presents to the office today for follow up of painful callouses, pain right foot and left, growing rapidly. Periodic debridements help. Painful at work. Works on her feet at [employer]. Currently denies F/C/N/V. Allergies   Allergen Reactions    Codeine Other (See Comments)     Stomach pain        Past Medical History:   Diagnosis Date    DDD (degenerative disc disease), thoracolumbar     Osteoarthritis        Prior to Admission medications    Medication Sig Start Date End Date Taking? Authorizing Provider   cyclobenzaprine (FLEXERIL) 5 MG tablet Take 1 tablet by mouth 2 times a day as needed for muscle spasms. 7/18/23  Yes Historical Provider, MD   B Complex Vitamins (VITAMIN B-COMPLEX PO) Take by mouth   Yes Historical Provider, MD   levothyroxine (SYNTHROID) 100 MCG tablet TAKE 1 TABLET BY MOUTH EVERY DAY 1/7/21  Yes Historical Provider, MD   vitamin D3 (CHOLECALCIFEROL) 125 MCG (5000 UT) TABS tablet Take 1 tablet by mouth daily   Yes Historical Provider, MD   sertraline (ZOLOFT) 50 MG tablet Take 1 tablet by mouth daily   Yes Historical Provider, MD   benzonatate (TESSALON) 100 MG capsule  6/27/22   Historical Provider, MD   celecoxib (CELEBREX) 100 MG capsule TAKE 1 CAPSULE BY MOUTH IN THE MORNING AND BEFORE BEDTIME  Patient not taking: Reported on 7/27/2023 5/17/22   Historical Provider, MD   trimethoprim-polymyxin b (POLYTRIM) 06131-3.1 UNIT/ML-% ophthalmic solution  6/28/22   Historical Provider, MD   hydroCHLOROthiazide (HYDRODIURIL) 25 MG tablet  5/17/22   Historical Provider, MD   traMADol (ULTRAM) 50 MG tablet Take 1 tablet by mouth.   Patient not taking: Reported on 7/27/2023 1/7/20   Historical Provider, MD       Past Surgical History:   Procedure Laterality

## 2023-10-24 ENCOUNTER — OFFICE VISIT (OUTPATIENT)
Dept: PODIATRY | Age: 63
End: 2023-10-24
Payer: COMMERCIAL

## 2023-10-24 VITALS — HEIGHT: 62 IN | WEIGHT: 168 LBS | BODY MASS INDEX: 30.91 KG/M2

## 2023-10-24 DIAGNOSIS — M79.671 PAIN IN BOTH FEET: ICD-10-CM

## 2023-10-24 DIAGNOSIS — D23.72 BENIGN NEOPLASM OF SKIN OF FOOT, LEFT: Primary | ICD-10-CM

## 2023-10-24 DIAGNOSIS — L85.1 ACQUIRED PLANTAR KERATODERMA: ICD-10-CM

## 2023-10-24 DIAGNOSIS — D23.71 BENIGN NEOPLASM OF SKIN OF FOOT, RIGHT: ICD-10-CM

## 2023-10-24 DIAGNOSIS — M79.672 PAIN IN BOTH FEET: ICD-10-CM

## 2023-10-24 PROCEDURE — 99999 PR OFFICE/OUTPT VISIT,PROCEDURE ONLY: CPT | Performed by: PODIATRIST

## 2023-10-24 PROCEDURE — 17110 DESTRUCTION B9 LES UP TO 14: CPT | Performed by: PODIATRIST

## 2023-10-24 ASSESSMENT — ENCOUNTER SYMPTOMS
VOMITING: 0
CONSTIPATION: 0
COLOR CHANGE: 0
NAUSEA: 0
DIARRHEA: 0

## 2023-10-24 NOTE — PROGRESS NOTES
[facility]  Return Patient    Chief Complaint:   Chief Complaint   Patient presents with    Nail Problem     B/l nail trim/ last seen Loco Chacko 8/29/2023       HPI: Kenya Gruber is a 61 y.o. female who presents to the office today for follow up of painful callouses, pain right foot and left, growing rapidly. Periodic debridements help. Painful at work. Works on her feet at [employer]. Currently denies F/C/N/V. Allergies   Allergen Reactions    Codeine Other (See Comments)     Stomach pain        Past Medical History:   Diagnosis Date    DDD (degenerative disc disease), thoracolumbar     Osteoarthritis        Prior to Admission medications    Medication Sig Start Date End Date Taking? Authorizing Provider   cyclobenzaprine (FLEXERIL) 5 MG tablet Take 1 tablet by mouth 2 times a day as needed for muscle spasms. 7/18/23  Yes Bill Chang MD   B Complex Vitamins (VITAMIN B-COMPLEX PO) Take by mouth   Yes Bill Chang MD   levothyroxine (SYNTHROID) 100 MCG tablet TAKE 1 TABLET BY MOUTH EVERY DAY 1/7/21  Yes Bill Chang MD   vitamin D3 (CHOLECALCIFEROL) 125 MCG (5000 UT) TABS tablet Take 1 tablet by mouth daily   Yes Bill Chang MD   sertraline (ZOLOFT) 50 MG tablet Take 1 tablet by mouth daily   Yes Bill Chang MD   traMADol (ULTRAM) 50 MG tablet Take 1 tablet by mouth.  1/7/20  Yes Bill Chang MD       Past Surgical History:   Procedure Laterality Date    APPENDECTOMY      CARPAL TUNNEL RELEASE      FOOT SURGERY      GALLBLADDER SURGERY         Family History   Problem Relation Age of Onset    Arthritis Mother     Cancer Mother     Arthritis Father     Arthritis Brother     Diabetes Maternal Uncle     Arthritis Maternal Grandmother     Cancer Maternal Grandmother     Diabetes Maternal Grandmother     Cancer Maternal Grandfather        Social History     Tobacco Use    Smoking status: Every Day     Packs/day: 0.50     Years: 40.00

## 2024-01-02 ENCOUNTER — OFFICE VISIT (OUTPATIENT)
Dept: PODIATRY | Age: 64
End: 2024-01-02
Payer: COMMERCIAL

## 2024-01-02 VITALS — WEIGHT: 168 LBS | HEIGHT: 62 IN | BODY MASS INDEX: 30.91 KG/M2

## 2024-01-02 DIAGNOSIS — D23.72 BENIGN NEOPLASM OF SKIN OF FOOT, LEFT: Primary | ICD-10-CM

## 2024-01-02 DIAGNOSIS — M79.671 PAIN IN BOTH FEET: ICD-10-CM

## 2024-01-02 DIAGNOSIS — D23.71 BENIGN NEOPLASM OF SKIN OF FOOT, RIGHT: ICD-10-CM

## 2024-01-02 DIAGNOSIS — L85.1 ACQUIRED PLANTAR KERATODERMA: ICD-10-CM

## 2024-01-02 DIAGNOSIS — M79.672 PAIN IN BOTH FEET: ICD-10-CM

## 2024-01-02 PROCEDURE — 17110 DESTRUCTION B9 LES UP TO 14: CPT | Performed by: PODIATRIST

## 2024-01-02 PROCEDURE — 99999 PR OFFICE/OUTPT VISIT,PROCEDURE ONLY: CPT | Performed by: PODIATRIST

## 2024-01-02 ASSESSMENT — ENCOUNTER SYMPTOMS
CONSTIPATION: 0
DIARRHEA: 0
VOMITING: 0
COLOR CHANGE: 0
NAUSEA: 0

## 2024-01-02 NOTE — PROGRESS NOTES
[facility]  Return Patient    Chief Complaint:   Chief Complaint   Patient presents with    Nail Problem     B/l nail trim/ last seen Carlos Beltrán 11/29/2023       HPI: Farida Vela is a 63 y.o. female who presents to the office today for follow up of painful callouses, pain right foot and left, growing rapidly. Periodic debridements help. Painful at work. Works on her feet at [employer]. Currently denies F/C/N/V.     Allergies   Allergen Reactions    Codeine Other (See Comments)     Stomach pain        Past Medical History:   Diagnosis Date    DDD (degenerative disc disease), thoracolumbar     Osteoarthritis        Prior to Admission medications    Medication Sig Start Date End Date Taking? Authorizing Provider   B Complex Vitamins (VITAMIN B-COMPLEX PO) Take by mouth   Yes Bill Chang MD   levothyroxine (SYNTHROID) 100 MCG tablet TAKE 1 TABLET BY MOUTH EVERY DAY 1/7/21  Yes Bill Chang MD   vitamin D3 (CHOLECALCIFEROL) 125 MCG (5000 UT) TABS tablet Take 1 tablet by mouth daily   Yes Bill Chang MD   sertraline (ZOLOFT) 50 MG tablet Take 1 tablet by mouth daily   Yes Bill hCang MD   traMADol (ULTRAM) 50 MG tablet Take 1 tablet by mouth. 1/7/20  Yes Bill Chang MD       Past Surgical History:   Procedure Laterality Date    APPENDECTOMY      CARPAL TUNNEL RELEASE      FOOT SURGERY      GALLBLADDER SURGERY         Family History   Problem Relation Age of Onset    Arthritis Mother     Cancer Mother     Arthritis Father     Arthritis Brother     Diabetes Maternal Uncle     Arthritis Maternal Grandmother     Cancer Maternal Grandmother     Diabetes Maternal Grandmother     Cancer Maternal Grandfather        Social History     Tobacco Use    Smoking status: Every Day     Current packs/day: 0.50     Average packs/day: 0.5 packs/day for 40.0 years (20.0 ttl pk-yrs)     Types: Cigarettes    Smokeless tobacco: Never   Substance Use Topics    Alcohol use:

## 2024-03-12 ENCOUNTER — OFFICE VISIT (OUTPATIENT)
Dept: PODIATRY | Age: 64
End: 2024-03-12
Payer: COMMERCIAL

## 2024-03-12 VITALS — BODY MASS INDEX: 31.1 KG/M2 | HEIGHT: 62 IN | WEIGHT: 169 LBS

## 2024-03-12 DIAGNOSIS — M79.671 PAIN IN BOTH FEET: ICD-10-CM

## 2024-03-12 DIAGNOSIS — D23.71 BENIGN NEOPLASM OF SKIN OF FOOT, RIGHT: ICD-10-CM

## 2024-03-12 DIAGNOSIS — L85.1 ACQUIRED PLANTAR KERATODERMA: ICD-10-CM

## 2024-03-12 DIAGNOSIS — M79.672 PAIN IN BOTH FEET: ICD-10-CM

## 2024-03-12 DIAGNOSIS — D23.72 BENIGN NEOPLASM OF SKIN OF FOOT, LEFT: Primary | ICD-10-CM

## 2024-03-12 PROCEDURE — 99999 PR OFFICE/OUTPT VISIT,PROCEDURE ONLY: CPT | Performed by: PODIATRIST

## 2024-03-12 PROCEDURE — 17110 DESTRUCTION B9 LES UP TO 14: CPT | Performed by: PODIATRIST

## 2024-03-12 RX ORDER — BUPROPION HYDROCHLORIDE 150 MG/1
150 TABLET, EXTENDED RELEASE ORAL 2 TIMES DAILY
COMMUNITY
Start: 2024-03-11

## 2024-03-12 RX ORDER — DILTIAZEM HYDROCHLORIDE 60 MG/1
2 TABLET, FILM COATED ORAL 2 TIMES DAILY
COMMUNITY
Start: 2024-02-27

## 2024-03-12 ASSESSMENT — ENCOUNTER SYMPTOMS
DIARRHEA: 0
CONSTIPATION: 0
VOMITING: 0
COLOR CHANGE: 0
NAUSEA: 0

## 2024-03-12 NOTE — PROGRESS NOTES
[facility]  Progress Note    Luciano Gaytan Patient Status:  Observation    1962 MRN [id number]   [facility] 3SW-A Attending Karan Wade MD   Hosp Day # 0 PCP Trip Varela MD     Subjective:  Patient sitting up at the beds the patient. More than 50% of that time was spent counseling the patient and/or on coordination of care. The diagnosis, prognosis, and general treatment was explained to the patient and the family.         DO SHANIQUE Rodriges General Surgery  3/23/201 keratoderma          Plan: Patient examined and evaluated.  Current condition and treatment options discussed in detail.   Verbal and written instructions given to patient. Contact office with any questions/problems/concerns.     The lesion(s) was partially debrided, enucleated, and silver nitrate was applied with an apperature pad under occlusion. The patient will leave in place for 24-48 hours and than remove. The patient tolerated the procedure well and without complication.       Wear good shoes    Orders Placed This Encounter   Procedures    52313 - [place] DESTRUCTION BENIGN LESIONS UP TO 14     No orders of the defined types were placed in this encounter.       RTC in 9 weeks    3/12/2024

## 2024-05-21 ENCOUNTER — OFFICE VISIT (OUTPATIENT)
Dept: PODIATRY | Age: 64
End: 2024-05-21
Payer: COMMERCIAL

## 2024-05-21 VITALS — BODY MASS INDEX: 31.1 KG/M2 | WEIGHT: 169 LBS | HEIGHT: 62 IN

## 2024-05-21 DIAGNOSIS — D23.71 BENIGN NEOPLASM OF SKIN OF FOOT, RIGHT: ICD-10-CM

## 2024-05-21 DIAGNOSIS — L85.1 ACQUIRED PLANTAR KERATODERMA: ICD-10-CM

## 2024-05-21 DIAGNOSIS — D23.72 BENIGN NEOPLASM OF SKIN OF FOOT, LEFT: Primary | ICD-10-CM

## 2024-05-21 DIAGNOSIS — M79.671 PAIN IN BOTH FEET: ICD-10-CM

## 2024-05-21 DIAGNOSIS — M79.672 PAIN IN BOTH FEET: ICD-10-CM

## 2024-05-21 PROCEDURE — 99999 PR OFFICE/OUTPT VISIT,PROCEDURE ONLY: CPT | Performed by: PODIATRIST

## 2024-05-21 PROCEDURE — 17110 DESTRUCTION B9 LES UP TO 14: CPT | Performed by: PODIATRIST

## 2024-05-21 ASSESSMENT — ENCOUNTER SYMPTOMS
NAUSEA: 0
DIARRHEA: 0
VOMITING: 0
COLOR CHANGE: 0
CONSTIPATION: 0

## 2024-05-21 NOTE — PROGRESS NOTES
[facility]  Return Patient    Chief Complaint:   Chief Complaint   Patient presents with    Nail Problem     Nail trim/last saw Carlos Beltrán 3/11/24       HPI: Farida Vela is a 63 y.o. female who presents to the office today for follow up of painful callouses, pain right foot and left, growing rapidly. Periodic debridements help. Painful at work. Works on her feet at [employer]. Currently denies F/C/N/V.     Allergies   Allergen Reactions    Codeine Other (See Comments)     Stomach pain        Past Medical History:   Diagnosis Date    DDD (degenerative disc disease), thoracolumbar     Osteoarthritis        Prior to Admission medications    Medication Sig Start Date End Date Taking? Authorizing Provider   SYMBICORT 80-4.5 MCG/ACT AERO Inhale 2 puffs into the lungs 2 times daily 2/27/24  Yes Bill Chang MD   buPROPion (WELLBUTRIN SR) 150 MG extended release tablet Take 1 tablet by mouth 2 times daily 3/11/24  Yes Bill Chang MD   B Complex Vitamins (VITAMIN B-COMPLEX PO) Take by mouth   Yes Bill Chang MD   levothyroxine (SYNTHROID) 100 MCG tablet TAKE 1 TABLET BY MOUTH EVERY DAY 1/7/21  Yes Bill Chang MD   vitamin D3 (CHOLECALCIFEROL) 125 MCG (5000 UT) TABS tablet Take 1 tablet by mouth daily   Yes Bill Chang MD   sertraline (ZOLOFT) 50 MG tablet Take 1 tablet by mouth daily   Yes Bill Chang MD   traMADol (ULTRAM) 50 MG tablet Take 1 tablet by mouth. 1/7/20  Yes Bill Chang MD       Past Surgical History:   Procedure Laterality Date    APPENDECTOMY      CARPAL TUNNEL RELEASE      FOOT SURGERY      GALLBLADDER SURGERY         Family History   Problem Relation Age of Onset    Arthritis Mother     Cancer Mother     Arthritis Father     Arthritis Brother     Diabetes Maternal Uncle     Arthritis Maternal Grandmother     Cancer Maternal Grandmother     Diabetes Maternal Grandmother     Cancer Maternal Grandfather        Social

## 2024-08-13 ENCOUNTER — OFFICE VISIT (OUTPATIENT)
Dept: PODIATRY | Age: 64
End: 2024-08-13
Payer: COMMERCIAL

## 2024-08-13 VITALS — WEIGHT: 178 LBS | BODY MASS INDEX: 32.76 KG/M2 | HEIGHT: 62 IN

## 2024-08-13 DIAGNOSIS — M79.671 PAIN IN BOTH FEET: ICD-10-CM

## 2024-08-13 DIAGNOSIS — L85.1 ACQUIRED PLANTAR KERATODERMA: ICD-10-CM

## 2024-08-13 DIAGNOSIS — D23.71 BENIGN NEOPLASM OF SKIN OF FOOT, RIGHT: ICD-10-CM

## 2024-08-13 DIAGNOSIS — M79.672 PAIN IN BOTH FEET: ICD-10-CM

## 2024-08-13 DIAGNOSIS — D23.72 BENIGN NEOPLASM OF SKIN OF FOOT, LEFT: Primary | ICD-10-CM

## 2024-08-13 PROCEDURE — 17110 DESTRUCTION B9 LES UP TO 14: CPT | Performed by: PODIATRIST

## 2024-08-13 PROCEDURE — 99999 PR OFFICE/OUTPT VISIT,PROCEDURE ONLY: CPT | Performed by: PODIATRIST

## 2024-08-13 ASSESSMENT — ENCOUNTER SYMPTOMS
NAUSEA: 0
DIARRHEA: 0
CONSTIPATION: 0
VOMITING: 0
COLOR CHANGE: 0

## 2024-08-13 NOTE — PROGRESS NOTES
[facility]  Return Patient    Chief Complaint:   Chief Complaint   Patient presents with    Nail Problem     B/l nail trim, last seen Nicolás Banegas3 5/28/24    Callouses     B/l       HPI: Farida Vela is a 64 y.o. female who presents to the office today for follow up of painful callouses, pain right foot and left, growing rapidly. Periodic debridements help. Painful at work. Works on her feet at [employer]. Currently denies F/C/N/V.     Allergies   Allergen Reactions    Codeine Other (See Comments)     Stomach pain        Past Medical History:   Diagnosis Date    DDD (degenerative disc disease), thoracolumbar     Osteoarthritis        Prior to Admission medications    Medication Sig Start Date End Date Taking? Authorizing Provider   SYMBICORT 80-4.5 MCG/ACT AERO Inhale 2 puffs into the lungs 2 times daily 2/27/24   Bill Chang MD   buPROPion (WELLBUTRIN SR) 150 MG extended release tablet Take 1 tablet by mouth 2 times daily 3/11/24   Bill Chang MD   B Complex Vitamins (VITAMIN B-COMPLEX PO) Take by mouth    Bill Chang MD   levothyroxine (SYNTHROID) 100 MCG tablet TAKE 1 TABLET BY MOUTH EVERY DAY 1/7/21   Bill Chang MD   vitamin D3 (CHOLECALCIFEROL) 125 MCG (5000 UT) TABS tablet Take 1 tablet by mouth daily    Bill Chang MD   sertraline (ZOLOFT) 50 MG tablet Take 1 tablet by mouth daily    Bill Chang MD   traMADol (ULTRAM) 50 MG tablet Take 1 tablet by mouth. 1/7/20   Bill Chang MD       Past Surgical History:   Procedure Laterality Date    APPENDECTOMY      CARPAL TUNNEL RELEASE      FOOT SURGERY      GALLBLADDER SURGERY         Family History   Problem Relation Age of Onset    Arthritis Mother     Cancer Mother     Arthritis Father     Arthritis Brother     Diabetes Maternal Uncle     Arthritis Maternal Grandmother     Cancer Maternal Grandmother     Diabetes Maternal Grandmother     Cancer Maternal Grandfather

## 2024-10-08 ENCOUNTER — OFFICE VISIT (OUTPATIENT)
Dept: PODIATRY | Age: 64
End: 2024-10-08
Payer: COMMERCIAL

## 2024-10-08 VITALS — WEIGHT: 178 LBS | BODY MASS INDEX: 32.76 KG/M2 | HEIGHT: 62 IN

## 2024-10-08 DIAGNOSIS — D23.72 BENIGN NEOPLASM OF SKIN OF FOOT, LEFT: Primary | ICD-10-CM

## 2024-10-08 DIAGNOSIS — M79.671 PAIN IN BOTH FEET: ICD-10-CM

## 2024-10-08 DIAGNOSIS — M79.672 PAIN IN BOTH FEET: ICD-10-CM

## 2024-10-08 DIAGNOSIS — D23.71 BENIGN NEOPLASM OF SKIN OF FOOT, RIGHT: ICD-10-CM

## 2024-10-08 PROCEDURE — 99999 PR OFFICE/OUTPT VISIT,PROCEDURE ONLY: CPT | Performed by: PODIATRIST

## 2024-10-08 PROCEDURE — 17110 DESTRUCTION B9 LES UP TO 14: CPT | Performed by: PODIATRIST

## 2024-10-08 ASSESSMENT — ENCOUNTER SYMPTOMS
NAUSEA: 0
VOMITING: 0
CONSTIPATION: 0
DIARRHEA: 0
COLOR CHANGE: 0

## 2024-10-08 NOTE — PROGRESS NOTES
[facility]  Return Patient    Chief Complaint:   Chief Complaint   Patient presents with    Nail Problem     Nail trim/last saw Dr.Eric Monzon 9/27/24    Callouses     B/l        HPI: Farida Vela is a 64 y.o. female who presents to the office today for follow up of painful callouses, pain right foot and left, growing rapidly. Periodic debridements help. Painful at work. Works on her feet at [employer] Currently denies F/C/N/V.     Allergies   Allergen Reactions    Codeine Other (See Comments)     Stomach pain        Past Medical History:   Diagnosis Date    DDD (degenerative disc disease), thoracolumbar     Osteoarthritis        Prior to Admission medications    Medication Sig Start Date End Date Taking? Authorizing Provider   SYMBICORT 80-4.5 MCG/ACT AERO Inhale 2 puffs into the lungs 2 times daily 2/27/24  Yes Bill Chang MD   buPROPion (WELLBUTRIN SR) 150 MG extended release tablet Take 1 tablet by mouth 2 times daily 3/11/24  Yes Bill Chang MD   B Complex Vitamins (VITAMIN B-COMPLEX PO) Take by mouth   Yes Bill Chang MD   levothyroxine (SYNTHROID) 100 MCG tablet TAKE 1 TABLET BY MOUTH EVERY DAY 1/7/21  Yes Bill Chang MD   vitamin D3 (CHOLECALCIFEROL) 125 MCG (5000 UT) TABS tablet Take 1 tablet by mouth daily   Yes Bill Chang MD   sertraline (ZOLOFT) 50 MG tablet Take 1 tablet by mouth daily   Yes Bill Chang MD   traMADol (ULTRAM) 50 MG tablet Take 1 tablet by mouth. 1/7/20  Yes Bill Chang MD       Past Surgical History:   Procedure Laterality Date    APPENDECTOMY      CARPAL TUNNEL RELEASE      FOOT SURGERY      GALLBLADDER SURGERY         Family History   Problem Relation Age of Onset    Arthritis Mother     Cancer Mother     Arthritis Father     Arthritis Brother     Diabetes Maternal Uncle     Arthritis Maternal Grandmother     Cancer Maternal Grandmother     Diabetes Maternal Grandmother     Cancer Maternal

## 2024-12-17 ENCOUNTER — OFFICE VISIT (OUTPATIENT)
Dept: PODIATRY | Age: 64
End: 2024-12-17

## 2024-12-17 VITALS — WEIGHT: 180 LBS | BODY MASS INDEX: 33.13 KG/M2 | HEIGHT: 62 IN

## 2024-12-17 DIAGNOSIS — M79.672 PAIN IN BOTH FEET: ICD-10-CM

## 2024-12-17 DIAGNOSIS — D23.72 BENIGN NEOPLASM OF SKIN OF FOOT, LEFT: Primary | ICD-10-CM

## 2024-12-17 DIAGNOSIS — D23.71 BENIGN NEOPLASM OF SKIN OF FOOT, RIGHT: ICD-10-CM

## 2024-12-17 DIAGNOSIS — L85.1 ACQUIRED PLANTAR KERATODERMA: ICD-10-CM

## 2024-12-17 DIAGNOSIS — M79.671 PAIN IN BOTH FEET: ICD-10-CM

## 2024-12-17 RX ORDER — MELOXICAM 7.5 MG/1
7.5 TABLET ORAL DAILY
COMMUNITY
Start: 2024-12-04

## 2024-12-17 RX ORDER — LIDOCAINE 50 MG/G
1 PATCH TOPICAL EVERY 12 HOURS
COMMUNITY
Start: 2024-12-04

## 2024-12-17 ASSESSMENT — ENCOUNTER SYMPTOMS
DIARRHEA: 0
CONSTIPATION: 0
NAUSEA: 0
VOMITING: 0
COLOR CHANGE: 0

## 2024-12-17 NOTE — PROGRESS NOTES
[facility]  Return Patient    Chief Complaint:   Chief Complaint   Patient presents with    Nail Problem     B/l nail trim,last seen Carlos [facility] 12/4/24    Callouses     B/l       HPI: Farida Vela is a 64 y.o. female who presents to the office today for follow up of painful callouses, pain right foot and left, growing rapidly. Periodic debridements help. Painful at work. Works on her feet at [employer]. Currently denies F/C/N/V.     Allergies   Allergen Reactions    Codeine Other (See Comments)     Stomach pain        Past Medical History:   Diagnosis Date    DDD (degenerative disc disease), thoracolumbar     Osteoarthritis        Prior to Admission medications    Medication Sig Start Date End Date Taking? Authorizing Provider   lidocaine (LIDODERM) 5 % Place 1 patch onto the skin in the morning and 1 patch in the evening. 12/4/24  Yes Bill Chang MD   meloxicam (MOBIC) 7.5 MG tablet Take 1 tablet by mouth daily 12/4/24  Yes Bill hCang MD   B Complex Vitamins (VITAMIN B-COMPLEX PO) Take by mouth    Bill Chang MD   levothyroxine (SYNTHROID) 100 MCG tablet TAKE 1 TABLET BY MOUTH EVERY DAY 1/7/21   Bill Chang MD   sertraline (ZOLOFT) 50 MG tablet Take 1 tablet by mouth daily    ProviderBill MD       Past Surgical History:   Procedure Laterality Date    APPENDECTOMY      CARPAL TUNNEL RELEASE      FOOT SURGERY      GALLBLADDER SURGERY         Family History   Problem Relation Age of Onset    Arthritis Mother     Cancer Mother     Arthritis Father     Arthritis Brother     Diabetes Maternal Uncle     Arthritis Maternal Grandmother     Cancer Maternal Grandmother     Diabetes Maternal Grandmother     Cancer Maternal Grandfather        Social History     Tobacco Use    Smoking status: Every Day     Current packs/day: 0.50     Average packs/day: 0.5 packs/day for 40.0 years (20.0 ttl pk-yrs)     Types: Cigarettes    Smokeless tobacco: Never

## 2025-02-25 ENCOUNTER — OFFICE VISIT (OUTPATIENT)
Dept: PODIATRY | Age: 65
End: 2025-02-25
Payer: COMMERCIAL

## 2025-02-25 VITALS — BODY MASS INDEX: 33.13 KG/M2 | WEIGHT: 180 LBS | HEIGHT: 62 IN

## 2025-02-25 DIAGNOSIS — L85.1 ACQUIRED PLANTAR KERATODERMA: ICD-10-CM

## 2025-02-25 DIAGNOSIS — M79.672 PAIN IN BOTH FEET: ICD-10-CM

## 2025-02-25 DIAGNOSIS — D23.72 BENIGN NEOPLASM OF SKIN OF FOOT, LEFT: Primary | ICD-10-CM

## 2025-02-25 DIAGNOSIS — D23.71 BENIGN NEOPLASM OF SKIN OF FOOT, RIGHT: ICD-10-CM

## 2025-02-25 DIAGNOSIS — M79.671 PAIN IN BOTH FEET: ICD-10-CM

## 2025-02-25 PROCEDURE — 99999 PR OFFICE/OUTPT VISIT,PROCEDURE ONLY: CPT | Performed by: PODIATRIST

## 2025-02-25 PROCEDURE — 17110 DESTRUCTION B9 LES UP TO 14: CPT | Performed by: PODIATRIST

## 2025-02-25 ASSESSMENT — ENCOUNTER SYMPTOMS
DIARRHEA: 0
COLOR CHANGE: 0
NAUSEA: 0
VOMITING: 0
CONSTIPATION: 0

## 2025-02-25 NOTE — PROGRESS NOTES
[facility]  Return Patient    Chief Complaint:   Chief Complaint   Patient presents with    Nail Problem     B/l nail trim/ last seen Carlos Beltrán 12/4/2024    Callouses     B/l callous trim       HPI: Farida Vela is a 64 y.o. female who presents to the office today for follow up of painful callouses, pain right foot and left, growing rapidly. Periodic debridements help. Painful at work. Works on her feet at [employer]. Currently denies F/C/N/V.     Allergies   Allergen Reactions    Codeine Other (See Comments)     Stomach pain        Past Medical History:   Diagnosis Date    DDD (degenerative disc disease), thoracolumbar     Osteoarthritis        Prior to Admission medications    Medication Sig Start Date End Date Taking? Authorizing Provider   lidocaine (LIDODERM) 5 % Place 1 patch onto the skin in the morning and 1 patch in the evening. 12/4/24  Yes Bill Chang MD   B Complex Vitamins (VITAMIN B-COMPLEX PO) Take by mouth   Yes Bill Chang MD   levothyroxine (SYNTHROID) 100 MCG tablet TAKE 1 TABLET BY MOUTH EVERY DAY 1/7/21  Yes Bill Chang MD   sertraline (ZOLOFT) 50 MG tablet Take 1 tablet by mouth daily   Yes Bill Chang MD       Past Surgical History:   Procedure Laterality Date    APPENDECTOMY      CARPAL TUNNEL RELEASE      FOOT SURGERY      GALLBLADDER SURGERY         Family History   Problem Relation Age of Onset    Arthritis Mother     Cancer Mother     Arthritis Father     Arthritis Brother     Diabetes Maternal Uncle     Arthritis Maternal Grandmother     Cancer Maternal Grandmother     Diabetes Maternal Grandmother     Cancer Maternal Grandfather        Social History     Tobacco Use    Smoking status: Every Day     Current packs/day: 0.50     Average packs/day: 0.5 packs/day for 40.0 years (20.0 ttl pk-yrs)     Types: Cigarettes    Smokeless tobacco: Never   Substance Use Topics    Alcohol use: Not on file       Review of Systems

## 2025-05-06 ENCOUNTER — OFFICE VISIT (OUTPATIENT)
Dept: PODIATRY | Age: 65
End: 2025-05-06
Payer: COMMERCIAL

## 2025-05-06 VITALS — WEIGHT: 180 LBS | BODY MASS INDEX: 33.13 KG/M2 | HEIGHT: 62 IN

## 2025-05-06 DIAGNOSIS — M79.672 PAIN IN BOTH FEET: ICD-10-CM

## 2025-05-06 DIAGNOSIS — D23.72 BENIGN NEOPLASM OF SKIN OF FOOT, LEFT: Primary | ICD-10-CM

## 2025-05-06 DIAGNOSIS — D23.71 BENIGN NEOPLASM OF SKIN OF FOOT, RIGHT: ICD-10-CM

## 2025-05-06 DIAGNOSIS — L85.1 ACQUIRED PLANTAR KERATODERMA: ICD-10-CM

## 2025-05-06 DIAGNOSIS — M79.671 PAIN IN BOTH FEET: ICD-10-CM

## 2025-05-06 PROCEDURE — 99999 PR OFFICE/OUTPT VISIT,PROCEDURE ONLY: CPT | Performed by: PODIATRIST

## 2025-05-06 PROCEDURE — 17110 DESTRUCTION B9 LES UP TO 14: CPT | Performed by: PODIATRIST

## 2025-05-06 RX ORDER — PREGABALIN 75 MG/1
CAPSULE ORAL
COMMUNITY
Start: 2025-03-31

## 2025-05-06 ASSESSMENT — ENCOUNTER SYMPTOMS
NAUSEA: 0
VOMITING: 0
CONSTIPATION: 0
COLOR CHANGE: 0
DIARRHEA: 0

## 2025-05-06 NOTE — PROGRESS NOTES
[facility]  Return Patient    Chief Complaint:   Chief Complaint   Patient presents with    Callouses     Callous trim b/l        HPI: Farida Vela is a 64 y.o. female who presents to the office today for follow up of painful callouses, pain right foot and left, growing rapidly. Periodic debridements help. Painful at work. Works on her feet at [employer]. Currently denies F/C/N/V.     Allergies   Allergen Reactions    Codeine Other (See Comments)     Stomach pain        Past Medical History:   Diagnosis Date    DDD (degenerative disc disease), thoracolumbar     Osteoarthritis        Prior to Admission medications    Medication Sig Start Date End Date Taking? Authorizing Provider   pregabalin (LYRICA) 75 MG capsule TAKE 1 CAPSULE BY MOUTH EVERY 12 HOURS AS NEEDED 3/31/25  Yes Bill Chang MD   B Complex Vitamins (VITAMIN B-COMPLEX PO) Take by mouth   Yes Bill Chang MD   levothyroxine (SYNTHROID) 100 MCG tablet TAKE 1 TABLET BY MOUTH EVERY DAY 1/7/21  Yes Bill Chang MD   sertraline (ZOLOFT) 50 MG tablet Take 1 tablet by mouth daily   Yes Bill Chang MD   lidocaine (LIDODERM) 5 % Place 1 patch onto the skin in the morning and 1 patch in the evening.  Patient not taking: Reported on 5/6/2025 12/4/24   Bill Chang MD       Past Surgical History:   Procedure Laterality Date    APPENDECTOMY      CARPAL TUNNEL RELEASE      FOOT SURGERY      GALLBLADDER SURGERY         Family History   Problem Relation Age of Onset    Arthritis Mother     Cancer Mother     Arthritis Father     Arthritis Brother     Diabetes Maternal Uncle     Arthritis Maternal Grandmother     Cancer Maternal Grandmother     Diabetes Maternal Grandmother     Cancer Maternal Grandfather        Social History     Tobacco Use    Smoking status: Every Day     Current packs/day: 0.50     Average packs/day: 0.5 packs/day for 40.0 years (20.0 ttl pk-yrs)     Types: Cigarettes    Smokeless tobacco:

## 2025-07-15 ENCOUNTER — OFFICE VISIT (OUTPATIENT)
Dept: PODIATRY | Age: 65
End: 2025-07-15
Payer: COMMERCIAL

## 2025-07-15 VITALS — HEIGHT: 62 IN | WEIGHT: 180 LBS | BODY MASS INDEX: 33.13 KG/M2

## 2025-07-15 DIAGNOSIS — M79.672 PAIN IN BOTH FEET: ICD-10-CM

## 2025-07-15 DIAGNOSIS — M77.51 TENDINITIS OF RIGHT FOOT: ICD-10-CM

## 2025-07-15 DIAGNOSIS — D23.71 BENIGN NEOPLASM OF SKIN OF FOOT, RIGHT: ICD-10-CM

## 2025-07-15 DIAGNOSIS — D23.72 BENIGN NEOPLASM OF SKIN OF FOOT, LEFT: Primary | ICD-10-CM

## 2025-07-15 DIAGNOSIS — M79.671 PAIN IN BOTH FEET: ICD-10-CM

## 2025-07-15 DIAGNOSIS — L85.1 ACQUIRED PLANTAR KERATODERMA: ICD-10-CM

## 2025-07-15 PROCEDURE — 17110 DESTRUCTION B9 LES UP TO 14: CPT | Performed by: PODIATRIST

## 2025-07-15 PROCEDURE — 99213 OFFICE O/P EST LOW 20 MIN: CPT | Performed by: PODIATRIST

## 2025-07-15 PROCEDURE — 1123F ACP DISCUSS/DSCN MKR DOCD: CPT | Performed by: PODIATRIST

## 2025-07-15 RX ORDER — PREDNISONE 10 MG/1
1 TABLET ORAL DAILY
Qty: 42 EACH | Refills: 0 | Status: SHIPPED | OUTPATIENT
Start: 2025-07-15

## 2025-07-15 ASSESSMENT — ENCOUNTER SYMPTOMS
CONSTIPATION: 0
VOMITING: 0
NAUSEA: 0
COLOR CHANGE: 0
DIARRHEA: 0

## 2025-07-15 NOTE — PROGRESS NOTES
[facility]  Return Patient    Chief Complaint:   Chief Complaint   Patient presents with    Callouses     B/l callous trim    Nail Problem     B/l nail trim, last seen Carlos Beltrán 3/14/25       HPI: Farida Vela is a 65 y.o. female who presents to the office today for follow up of painful callouses, pain right foot and left, growing rapidly. Periodic debridements help. Painful at work. Works on her feet at [employer]. Currently denies F/C/N/V.     She does have some arch pain, started about 2 weeks ago. Uses a ladder at work, no real injury. Wears good shoes.     Allergies   Allergen Reactions    Codeine Other (See Comments)     Stomach pain        Past Medical History:   Diagnosis Date    DDD (degenerative disc disease), thoracolumbar     Osteoarthritis        Prior to Admission medications    Medication Sig Start Date End Date Taking? Authorizing Provider   predniSONE 10 MG (21) TBPK Take 1 tablet by mouth daily Day 1&2 60 mg, Day 3&4 50 mg, Day 5&6 40 mg, Day 7&8 30 mg, Day 9&10 20 mg, Day 11&12 10 mg 7/15/25  Yes Anahi Rinaldi DPM   pregabalin (LYRICA) 75 MG capsule TAKE 1 CAPSULE BY MOUTH EVERY 12 HOURS AS NEEDED  Patient not taking: Reported on 7/15/2025 3/31/25   Bill Chang MD   lidocaine (LIDODERM) 5 % Place 1 patch onto the skin in the morning and 1 patch in the evening.  Patient not taking: Reported on 7/15/2025 12/4/24   Bill Chang MD   B Complex Vitamins (VITAMIN B-COMPLEX PO) Take by mouth    Bill Chang MD   levothyroxine (SYNTHROID) 100 MCG tablet TAKE 1 TABLET BY MOUTH EVERY DAY 1/7/21   Bill Chang MD   sertraline (ZOLOFT) 50 MG tablet Take 1 tablet by mouth daily    Bill Chang MD       Past Surgical History:   Procedure Laterality Date    APPENDECTOMY      CARPAL TUNNEL RELEASE      FOOT SURGERY      GALLBLADDER SURGERY         Family History   Problem Relation Age of Onset    Arthritis Mother     Cancer Mother